# Patient Record
Sex: FEMALE | Race: OTHER | HISPANIC OR LATINO | URBAN - METROPOLITAN AREA
[De-identification: names, ages, dates, MRNs, and addresses within clinical notes are randomized per-mention and may not be internally consistent; named-entity substitution may affect disease eponyms.]

---

## 2024-09-22 PROBLEM — D50.9 ANEMIA, IRON DEFICIENCY: Status: ACTIVE | Noted: 2024-09-22

## 2024-09-23 ENCOUNTER — OUTPATIENT (OUTPATIENT)
Dept: OUTPATIENT SERVICES | Facility: HOSPITAL | Age: 39
LOS: 1 days | End: 2024-09-23
Payer: MEDICAID

## 2024-09-23 DIAGNOSIS — O26.899 OTHER SPECIFIED PREGNANCY RELATED CONDITIONS, UNSPECIFIED TRIMESTER: ICD-10-CM

## 2024-09-23 PROCEDURE — G0463: CPT

## 2024-09-23 PROCEDURE — 99212 OFFICE O/P EST SF 10 MIN: CPT

## 2024-09-27 ENCOUNTER — OUTPATIENT (OUTPATIENT)
Dept: INPATIENT UNIT | Facility: HOSPITAL | Age: 39
LOS: 1 days | Discharge: HOME | End: 2024-09-27
Payer: MEDICAID

## 2024-09-27 VITALS
RESPIRATION RATE: 15 BRPM | TEMPERATURE: 99 F | DIASTOLIC BLOOD PRESSURE: 67 MMHG | SYSTOLIC BLOOD PRESSURE: 125 MMHG | HEART RATE: 71 BPM

## 2024-09-27 VITALS — OXYGEN SATURATION: 98 % | HEART RATE: 68 BPM

## 2024-09-27 DIAGNOSIS — O26.899 OTHER SPECIFIED PREGNANCY RELATED CONDITIONS, UNSPECIFIED TRIMESTER: ICD-10-CM

## 2024-09-27 DIAGNOSIS — Z98.890 OTHER SPECIFIED POSTPROCEDURAL STATES: Chronic | ICD-10-CM

## 2024-09-27 LAB
ALBUMIN SERPL ELPH-MCNC: 3.3 G/DL — SIGNIFICANT CHANGE UP (ref 3.3–5)
ALP SERPL-CCNC: 99 U/L — SIGNIFICANT CHANGE UP (ref 40–120)
ALT FLD-CCNC: 9 U/L — SIGNIFICANT CHANGE UP (ref 4–33)
ANION GAP SERPL CALC-SCNC: 12 MMOL/L — SIGNIFICANT CHANGE UP (ref 7–14)
APPEARANCE UR: ABNORMAL
AST SERPL-CCNC: 11 U/L — SIGNIFICANT CHANGE UP (ref 4–32)
BACTERIA # UR AUTO: ABNORMAL /HPF
BASOPHILS # BLD AUTO: 0.03 K/UL — SIGNIFICANT CHANGE UP (ref 0–0.2)
BASOPHILS NFR BLD AUTO: 0.3 % — SIGNIFICANT CHANGE UP (ref 0–2)
BILIRUB SERPL-MCNC: <0.2 MG/DL — SIGNIFICANT CHANGE UP (ref 0.2–1.2)
BILIRUB UR-MCNC: NEGATIVE — SIGNIFICANT CHANGE UP
BUN SERPL-MCNC: 5 MG/DL — LOW (ref 7–23)
CALCIUM SERPL-MCNC: 8.7 MG/DL — SIGNIFICANT CHANGE UP (ref 8.4–10.5)
CAST: 1 /LPF — SIGNIFICANT CHANGE UP (ref 0–4)
CHLORIDE SERPL-SCNC: 106 MMOL/L — SIGNIFICANT CHANGE UP (ref 98–107)
CO2 SERPL-SCNC: 20 MMOL/L — LOW (ref 22–31)
COLOR SPEC: YELLOW — SIGNIFICANT CHANGE UP
CREAT ?TM UR-MCNC: 44 MG/DL — SIGNIFICANT CHANGE UP
CREAT SERPL-MCNC: 0.47 MG/DL — LOW (ref 0.5–1.3)
DIFF PNL FLD: NEGATIVE — SIGNIFICANT CHANGE UP
EGFR: 125 ML/MIN/1.73M2 — SIGNIFICANT CHANGE UP
EOSINOPHIL # BLD AUTO: 0.06 K/UL — SIGNIFICANT CHANGE UP (ref 0–0.5)
EOSINOPHIL NFR BLD AUTO: 0.6 % — SIGNIFICANT CHANGE UP (ref 0–6)
GLUCOSE SERPL-MCNC: 76 MG/DL — SIGNIFICANT CHANGE UP (ref 70–99)
GLUCOSE UR QL: NEGATIVE MG/DL — SIGNIFICANT CHANGE UP
HCT VFR BLD CALC: 31.2 % — LOW (ref 34.5–45)
HGB BLD-MCNC: 10.1 G/DL — LOW (ref 11.5–15.5)
IANC: 6.58 K/UL — SIGNIFICANT CHANGE UP (ref 1.8–7.4)
IMM GRANULOCYTES NFR BLD AUTO: 0.5 % — SIGNIFICANT CHANGE UP (ref 0–0.9)
KETONES UR-MCNC: NEGATIVE MG/DL — SIGNIFICANT CHANGE UP
LDH SERPL L TO P-CCNC: 171 U/L — SIGNIFICANT CHANGE UP (ref 135–225)
LEUKOCYTE ESTERASE UR-ACNC: ABNORMAL
LYMPHOCYTES # BLD AUTO: 2.04 K/UL — SIGNIFICANT CHANGE UP (ref 1–3.3)
LYMPHOCYTES # BLD AUTO: 21.8 % — SIGNIFICANT CHANGE UP (ref 13–44)
MCHC RBC-ENTMCNC: 30.3 PG — SIGNIFICANT CHANGE UP (ref 27–34)
MCHC RBC-ENTMCNC: 32.4 GM/DL — SIGNIFICANT CHANGE UP (ref 32–36)
MCV RBC AUTO: 93.7 FL — SIGNIFICANT CHANGE UP (ref 80–100)
MONOCYTES # BLD AUTO: 0.59 K/UL — SIGNIFICANT CHANGE UP (ref 0–0.9)
MONOCYTES NFR BLD AUTO: 6.3 % — SIGNIFICANT CHANGE UP (ref 2–14)
NEUTROPHILS # BLD AUTO: 6.58 K/UL — SIGNIFICANT CHANGE UP (ref 1.8–7.4)
NEUTROPHILS NFR BLD AUTO: 70.5 % — SIGNIFICANT CHANGE UP (ref 43–77)
NITRITE UR-MCNC: NEGATIVE — SIGNIFICANT CHANGE UP
NRBC # BLD: 0 /100 WBCS — SIGNIFICANT CHANGE UP (ref 0–0)
NRBC # FLD: 0 K/UL — SIGNIFICANT CHANGE UP (ref 0–0)
PH UR: 6.5 — SIGNIFICANT CHANGE UP (ref 5–8)
PLATELET # BLD AUTO: 268 K/UL — SIGNIFICANT CHANGE UP (ref 150–400)
POTASSIUM SERPL-MCNC: 4 MMOL/L — SIGNIFICANT CHANGE UP (ref 3.5–5.3)
POTASSIUM SERPL-SCNC: 4 MMOL/L — SIGNIFICANT CHANGE UP (ref 3.5–5.3)
PROT ?TM UR-MCNC: 8 MG/DL — SIGNIFICANT CHANGE UP
PROT SERPL-MCNC: 6.6 G/DL — SIGNIFICANT CHANGE UP (ref 6–8.3)
PROT UR-MCNC: NEGATIVE MG/DL — SIGNIFICANT CHANGE UP
PROT/CREAT UR-RTO: 0.2 RATIO — SIGNIFICANT CHANGE UP (ref 0–0.2)
RBC # BLD: 3.33 M/UL — LOW (ref 3.8–5.2)
RBC # FLD: 12.9 % — SIGNIFICANT CHANGE UP (ref 10.3–14.5)
RBC CASTS # UR COMP ASSIST: 0 /HPF — SIGNIFICANT CHANGE UP (ref 0–4)
REVIEW: SIGNIFICANT CHANGE UP
SODIUM SERPL-SCNC: 138 MMOL/L — SIGNIFICANT CHANGE UP (ref 135–145)
SP GR SPEC: 1.01 — SIGNIFICANT CHANGE UP (ref 1–1.03)
SQUAMOUS # UR AUTO: 12 /HPF — HIGH (ref 0–5)
URATE SERPL-MCNC: 4.1 MG/DL — SIGNIFICANT CHANGE UP (ref 2.5–7)
UROBILINOGEN FLD QL: 0.2 MG/DL — SIGNIFICANT CHANGE UP (ref 0.2–1)
WBC # BLD: 9.35 K/UL — SIGNIFICANT CHANGE UP (ref 3.8–10.5)
WBC # FLD AUTO: 9.35 K/UL — SIGNIFICANT CHANGE UP (ref 3.8–10.5)
WBC UR QL: 35 /HPF — HIGH (ref 0–5)

## 2024-09-27 PROCEDURE — 99222 1ST HOSP IP/OBS MODERATE 55: CPT

## 2024-09-27 RX ORDER — DIPHENHYDRAMINE HCL 12.5MG/5ML
25 LIQUID (ML) ORAL ONCE
Refills: 0 | Status: COMPLETED | OUTPATIENT
Start: 2024-09-27 | End: 2024-09-27

## 2024-09-27 RX ORDER — SODIUM CHLORIDE IRRIG SOLUTION 0.9 %
500 SOLUTION, IRRIGATION IRRIGATION ONCE
Refills: 0 | Status: COMPLETED | OUTPATIENT
Start: 2024-09-27 | End: 2024-09-27

## 2024-09-27 RX ORDER — METOCLOPRAMIDE HCL 5 MG
10 TABLET ORAL ONCE
Refills: 0 | Status: COMPLETED | OUTPATIENT
Start: 2024-09-27 | End: 2024-09-27

## 2024-09-27 RX ADMIN — Medication 1000 MILLILITER(S): at 20:10

## 2024-09-27 RX ADMIN — Medication 10 MILLIGRAM(S): at 19:11

## 2024-09-27 RX ADMIN — Medication 25 MILLIGRAM(S): at 19:13

## 2024-09-27 NOTE — OB RN TRIAGE NOTE - TELEPHONIC ID NUMBER OF THE INTERPRETER
Pharmacy Medication History Note      List of current medications patient is taking is complete. Source of information: Patient    Changes made to medication list:  Medications removed (include reason, ex. therapy complete or physician discontinued):  Estradiol-progesterone- not taking per patient    Medications added/doses adjusted:  [place] Thyroid- patient is taking at home  Hydrocortisone- patient is taking at home    Other notes (ex. Recent course of antibiotics, Coumadin dosing):  Denies use of other OTC or herbal medications.       Allergies reviewed      Electronically signed by Damaris Jay on 9/10/2019 at 11:33 AM [id number]

## 2024-09-27 NOTE — OB PROVIDER TRIAGE NOTE - NSHPPHYSICALEXAM_GEN_ALL_CORE
Assessment reveals VSS, abdomen soft, NT, gravid.   Cat 1 FHT x 2, ctx 2-4 on toco, patient unaware.  SSE- cervix appears closed. Pea sized ulcerated lesion noted on left labia. Reports she tried to shave and thinks she cut herself. Reports it was painful. Denies H/O HSV. Viral culture sent.   CL 4.9 no funneling or dynamic changes  VE 1/0/-3    HELLP labs  Benadryl 25mg IVP/Reglan 10 MG IVP Assessment reveals VSS, abdomen soft, NT, gravid.   Cat 1 FHT x 2, ctx 2-4 on toco, patient unaware.  SSE- cervix appears closed. Pea sized ulcerated lesion noted on left labia. Reports she tried to shave and thinks she cut herself. Reports it was painful. Denies H/O HSV. Viral culture sent.   CL 4.9 no funneling or dynamic changes  VE 1/0/-3    HELLP labs  Benadryl 25mg IVP/Reglan 10 MG IVP    2000: HELLP labs WNL  PCR 0.2  D/W Dr. Rios MFM fellow.     For LR 500cc bolus Assessment reveals VSS, abdomen soft, NT, gravid.   Cat 1 FHT x 2, ctx 2-4 on toco, patient unaware.  SSE- cervix appears closed. Pea sized ulcerated lesion noted on left labia. Reports she tried to shave and thinks she cut herself. Reports it was painful. Denies H/O HSV. Viral culture sent.   CL 4.9 no funneling or dynamic changes  VE 1/0/-3    HELLP labs  Benadryl 25mg IVP/Reglan 10 MG IVP    2000: HELLP labs WNL  Reports complete relief of PERALTA  PCR 0.2  D/W Dr. Rios M fellow.     For LR 500cc bolus and re-evaluate in 2 hours    2140: Cat 1 FHT x 2, ctx 2-4 on toco, patient unaware.   VE 1/0/-3 (exam unchanged)     [id number] used  Plan of care d/w patient at length. Recommended to transfer here for pregnancy. Reports she likes her MD at [facility] and wants to stay there. Explained that if she needed to be delivered , she would be sent here for the NICU, patient reports she understands. Told to return here with any signs of labor.     Plan D/W Dr. Carranza, due to frequent contractions, for continued monitoring and to be re-evaluated by Dr. Lara. Assessment reveals VSS, abdomen soft, NT, gravid.   Cat 1 FHT x 2, ctx 2-4 on toco, patient unaware.  SSE- cervix appears closed. Pea sized ulcerated lesion noted on left labia. Reports she tried to shave and thinks she cut herself. Reports it was painful. Denies H/O HSV. Viral culture sent.   CL 4.9 no funneling or dynamic changes  VE 1/0/-3    HELLP labs  Benadryl 25mg IVP/Reglan 10 MG IVP    2000: HELLP labs WNL  Reports complete relief of PERALTA  PCR 0.2  D/W Dr. Rios M fellow.     For LR 500cc bolus and re-evaluate in 2 hours    0: Cat 1 FHT x 2, ctx 2-4 on toco, patient unaware.   VE 1/0/-3 (exam unchanged)     [id number] used  Plan of care d/w patient at length. Recommended to transfer here for pregnancy. Reports she likes her MD at [facility] and wants to stay there. Explained that if she needed to be delivered , she would be sent here for the NICU, patient reports she understands. Told to return here with any signs of labor.     Plan D/W Dr. Carranza, due to frequent contractions, for continued monitoring and to be re-evaluated by Dr. Lara.    : Patient re-evaluated by Dr. Lara, exam unchanged. Assessment reveals VSS, abdomen soft, NT, gravid.   Cat 1 FHT x 2, ctx 2-4 on toco, patient unaware.  SSE- cervix appears closed. Pea sized ulcerated lesion noted on left labia. Reports she tried to shave and thinks she cut herself. Reports it was painful. Denies H/O HSV. Viral culture sent.   CL 4.9 no funneling or dynamic changes  VE 1/0/-3    HELLP labs  Benadryl 25mg IVP/Reglan 10 MG IVP    2000: HELLP labs WNL  Reports complete relief of PERALTA  PCR 0.2  D/W Dr. Rios [facility] fellow.     For LR 500cc bolus and re-evaluate in 2 hours    0: Cat 1 FHT x 2, ctx 2-4 on toco, patient unaware.   VE 1/0/-3 (exam unchanged)     [id number] used  Plan of care d/w patient at length. Recommended to transfer here for pregnancy. Reports she likes her MD at [facility] and wants to stay there. Explained that if she needed to be delivered , she would be sent here for the NICU, patient reports she understands. Told to return here with any signs of labor.     Plan D/W Dr. Carranza, due to frequent contractions, for continued monitoring and to be re-evaluated by Dr. Lara.    : Patient re-evaluated by Dr. Lara, exam unchanged.  Plan discussed between Dr. Lara and Dr. [facility]

## 2024-09-27 NOTE — OB RN TRIAGE NOTE - CHIEF COMPLAINT QUOTE
sent from [facility] for r/o PEC, headache x4 days no relief with tylenol.

## 2024-09-27 NOTE — OB PROVIDER TRIAGE NOTE - NS_OBGYNHISTORY_OBGYN_ALL_OB_FT
FT no complications    FT no complications    AP course complicated by:  Fibroids  Di-Di TIUP  IUGR fetus B  CHTN    ATU us today:   BPP 8/8 x 2, Fetus A 1090gm, 36th%- dopplers WNL, vtx  Fetus B 816gm 1st%- S/D ration dopplers elevated, vtx  CL > 4cm

## 2024-09-27 NOTE — OB PROVIDER TRIAGE NOTE - NS_SPECEXAM_OBGYN_ALL_OB
Multiple attempts made to reach the pt and messages left with no returned phone call. Past TCM timeframe, closing encounter. Yes

## 2024-09-27 NOTE — OB PROVIDER TRIAGE NOTE - CURRENT PREGNANCY COMPLICATIONS, OB PROFILE
Multiple Gestation/Gestational Age less than 36 Weeks/Hypertensive Disorder/Intrauterine Growth Restriction/Maternal Unknown GBS

## 2024-09-27 NOTE — OB PROVIDER TRIAGE NOTE - NSDOBORLOSS1_OBGYN_ALL_OB_DT
From: Miguel A Finley  To: Chad Beth  Sent: 5/30/2024 1:45 PM CDT  Subject: Test Results    Dr. Beth,  I had the Basic Metabolic Panel redone on 5/29/24. Compared to the results of the 4/30/24 test, the values are now all within the normal range. The GFR is the highest it has been in a long time. You mentioned in my last After Visit Summary notes that after the above tests were done you would place an order in the system for a CBC, BMP and Hepatic Function Panel before my visit in 6 months with Dr. Barragan.  Thank you again for all the years of health care you have given me, especially in the last few years. Enjoy your [facility].  Nagi Finley   05-Feb-2012

## 2024-09-27 NOTE — OB PROVIDER TRIAGE NOTE - SECONDARY DIAGNOSIS.
Dichorionic diamniotic twin gestation False labor before 37 completed weeks of gestation, second trimester

## 2024-09-27 NOTE — OB PROVIDER TRIAGE NOTE - HISTORY OF PRESENT ILLNESS
History and Physical performed using  [id number]  37yo  @ 27.4 with naturally conceived Di-Di TIUP sent for evaluation for R/O PEC. Patent gets care with Dr. Chin of [facility]. Was seen today in ATU and c/o HA x 4 days unrelieved by Tylenol. Reports HA waxes and wains and is worse at night.  Denies N/V, visual disturbances, epigastric pain. Denies LOF, VB and reports GFM. Normal BP's this pregnancy.  Patient back and fourth to [facility] this pregnancy Was diagnosed there in July with CHTN and started in labetalol 100mg BID. Last dose 9am.  Today was her first ATU sonogram in [place] since nuchal. Had US in [facility] and found to have IUGR of Baby B. Reports she will not be returning to [facility] anymore this pregnancy.  AP course complicated by IUGR of fetus B/CHTN    Reports recent H/O Anxiety and feeling of SOB. Reports shes unable to sleep due to the anxiety for past few days. Has therapy apt next week. Has never had these symptoms before and never sought therapy. Reports feeling safe at home and denies suicidal or self harm ideation.   Right Knee Sx  CHTN  Fibroids

## 2024-09-27 NOTE — OB PROVIDER TRIAGE NOTE - ADDITIONAL INSTRUCTIONS
Follow up Monday for ultrasound as scheduled  [facility] clinic [phone] (to transfer care)   Return here for signs of labor such as leaking of fluid, vaginal bleeding, abdominal pain or for decreased fetal movement.

## 2024-09-27 NOTE — OB PROVIDER TRIAGE NOTE - NSOBPROVIDERNOTE_OBGYN_ALL_OB_FT
Plan D/W Dr. Lara, patient dc'd home.  Follow up Monday for ultrasound as scheduled  [facility] clinic [phone] (to transfer care)   Return here for signs of labor such as leaking of fluid, vaginal bleeding, abdominal pain or for decreased fetal movement.

## 2024-09-27 NOTE — OB PROVIDER TRIAGE NOTE - NS_FHRDECEL_OBGYN_ALL_OB
[FreeTextEntry1] : I have reviewed the pertinent imaging, blood work and pathology.  No Decelerations

## 2024-09-28 PROBLEM — Z78.9 OTHER SPECIFIED HEALTH STATUS: Chronic | Status: INACTIVE | Noted: 2019-09-21 | Resolved: 2024-09-27

## 2024-09-28 LAB
HSV+VZV DNA SPEC QL NAA+PROBE: ABNORMAL
SPECIMEN SOURCE: SIGNIFICANT CHANGE UP

## 2024-09-30 DIAGNOSIS — O99.342 OTHER MENTAL DISORDERS COMPLICATING PREGNANCY, SECOND TRIMESTER: ICD-10-CM

## 2024-09-30 DIAGNOSIS — O99.891 OTHER SPECIFIED DISEASES AND CONDITIONS COMPLICATING PREGNANCY: ICD-10-CM

## 2024-09-30 DIAGNOSIS — O10.912 UNSPECIFIED PRE-EXISTING HYPERTENSION COMPLICATING PREGNANCY, SECOND TRIMESTER: ICD-10-CM

## 2024-09-30 DIAGNOSIS — O30.042 TWIN PREGNANCY, DICHORIONIC/DIAMNIOTIC, SECOND TRIMESTER: ICD-10-CM

## 2024-09-30 DIAGNOSIS — Z3A.27 27 WEEKS GESTATION OF PREGNANCY: ICD-10-CM

## 2024-09-30 DIAGNOSIS — O09.522 SUPERVISION OF ELDERLY MULTIGRAVIDA, SECOND TRIMESTER: ICD-10-CM

## 2024-09-30 DIAGNOSIS — O47.02 FALSE LABOR BEFORE 37 COMPLETED WEEKS OF GESTATION, SECOND TRIMESTER: ICD-10-CM

## 2024-09-30 DIAGNOSIS — O36.5922 MATERNAL CARE FOR OTHER KNOWN OR SUSPECTED POOR FETAL GROWTH, SECOND TRIMESTER, FETUS 2: ICD-10-CM

## 2024-09-30 DIAGNOSIS — D21.9 BENIGN NEOPLASM OF CONNECTIVE AND OTHER SOFT TISSUE, UNSPECIFIED: ICD-10-CM

## 2024-09-30 PROBLEM — I10 ESSENTIAL (PRIMARY) HYPERTENSION: Chronic | Status: ACTIVE | Noted: 2024-09-27

## 2024-09-30 RX ORDER — VALACYCLOVIR 1000 MG/1
1 TABLET ORAL
Qty: 14 | Refills: 0
Start: 2024-09-30 | End: 2024-10-06

## 2024-09-30 NOTE — CHART NOTE - NSCHARTNOTEFT_GEN_A_CORE
R3 OB Chart Note   ID [id number]    Called pt to discuss HSV-2 positive vulvar lesion from L&D triage visit 9/27. Encouraged pt to discuss dx with sexual partners. Encouraged pt to notify her OB at [place] and that prophylactic Valtrex would be indicated at 36w for suppression near timing of delivery. 1g Valtrex BID x7d sent to pt's preferred pharmacy. NKDA confirmed. Questions answered.    VTimmel PGY3
Received telephone report regarding this patient with history obtained by triage provider:    Patient is a 37yo  at 27w4d with di-di TIUP, history of cHTN on labetalol 100mg BID, sent from ATU for severe growth restriction of baby B (EFW 816g, 1%ile, AC 4%ile) with elevated UAD and complaints of persistent headache. Patient reports being diagnosed with cHTN early in pregnancy and was started on labetaloll 100mg which she takes and was also diagnosed with IUGR in [facility] in July. Headache has lasted for 4 days and patient believes it is possibly due to lack of sleep due to anxiety. Plans to see therapist. No other acute symptoms elicited.      On monitor reactive NST for both fetuses, annamarie q3min on toco (not felt by patient). Triage exam by ACP was 1/0/-3. CL 4cm on TVUS. Preeclamptic labs all wnl. Upcr 0.2. BP also within normal range.   Per triage provider, after dose of reglan and benadryl patient reported that headache had completely resolved.     Recommendations:  - IV fluid hydration and re-examination 2 hrs after last exam due to frequent contractions.  - If exam is unchanged, patient remains asymptomatic, NST reactive with no decels, and BPs normotensive, can discharge with precautions.  - Script for reglan/benadryl if tylenol not helping at home.  - Follow up for twice weekly BPP/Dopplers at [address] [place] [address] clinic if patient amenable to transferring care   - f/u viral culture of ulcerative lesion of labia     Discussed with CONCEPCIÓN Saleh Attending  CONCEPCIÓN Cooley Fellow
HSV 2 detected PCR  d/w Dr Burrell pt has appt with PCAP on 9/30 will notify Margaret Hegarty NP

## 2024-10-07 DIAGNOSIS — O26.892 OTHER SPECIFIED PREGNANCY RELATED CONDITIONS, SECOND TRIMESTER: ICD-10-CM

## 2024-10-07 DIAGNOSIS — O10.912 UNSPECIFIED PRE-EXISTING HYPERTENSION COMPLICATING PREGNANCY, SECOND TRIMESTER: ICD-10-CM

## 2024-10-07 DIAGNOSIS — Z3A.27 27 WEEKS GESTATION OF PREGNANCY: ICD-10-CM

## 2024-10-07 DIAGNOSIS — O30.042 TWIN PREGNANCY, DICHORIONIC/DIAMNIOTIC, SECOND TRIMESTER: ICD-10-CM

## 2024-10-07 DIAGNOSIS — O09.522 SUPERVISION OF ELDERLY MULTIGRAVIDA, SECOND TRIMESTER: ICD-10-CM

## 2024-10-07 DIAGNOSIS — R06.02 SHORTNESS OF BREATH: ICD-10-CM

## 2024-10-07 DIAGNOSIS — D64.9 ANEMIA, UNSPECIFIED: ICD-10-CM

## 2024-10-07 DIAGNOSIS — O99.342 OTHER MENTAL DISORDERS COMPLICATING PREGNANCY, SECOND TRIMESTER: ICD-10-CM

## 2024-10-07 DIAGNOSIS — O99.810 ABNORMAL GLUCOSE COMPLICATING PREGNANCY: ICD-10-CM

## 2024-10-07 DIAGNOSIS — O99.012 ANEMIA COMPLICATING PREGNANCY, SECOND TRIMESTER: ICD-10-CM

## 2024-10-09 ENCOUNTER — NON-APPOINTMENT (OUTPATIENT)
Age: 39
End: 2024-10-09

## 2024-10-11 ENCOUNTER — APPOINTMENT (OUTPATIENT)
Dept: ANTEPARTUM | Facility: CLINIC | Age: 39
End: 2024-10-11
Payer: MEDICAID

## 2024-10-11 ENCOUNTER — NON-APPOINTMENT (OUTPATIENT)
Age: 39
End: 2024-10-11

## 2024-10-11 ENCOUNTER — ASOB RESULT (OUTPATIENT)
Age: 39
End: 2024-10-11

## 2024-10-11 PROCEDURE — 76820 UMBILICAL ARTERY ECHO: CPT | Mod: 59

## 2024-10-11 PROCEDURE — 76819 FETAL BIOPHYS PROFIL W/O NST: CPT

## 2024-10-11 PROCEDURE — 76816 OB US FOLLOW-UP PER FETUS: CPT | Mod: 59

## 2024-10-14 ENCOUNTER — APPOINTMENT (OUTPATIENT)
Dept: MATERNAL FETAL MEDICINE | Facility: HOSPITAL | Age: 39
End: 2024-10-14

## 2024-10-14 ENCOUNTER — NON-APPOINTMENT (OUTPATIENT)
Age: 39
End: 2024-10-14

## 2024-10-14 ENCOUNTER — APPOINTMENT (OUTPATIENT)
Dept: ANTEPARTUM | Facility: CLINIC | Age: 39
End: 2024-10-14

## 2024-10-14 ENCOUNTER — RESULT REVIEW (OUTPATIENT)
Age: 39
End: 2024-10-14

## 2024-10-14 ENCOUNTER — OUTPATIENT (OUTPATIENT)
Dept: OUTPATIENT SERVICES | Facility: HOSPITAL | Age: 39
LOS: 1 days | End: 2024-10-14

## 2024-10-14 ENCOUNTER — INPATIENT (INPATIENT)
Facility: HOSPITAL | Age: 39
LOS: 1 days | Discharge: HOME | End: 2024-10-16
Attending: SPECIALIST | Admitting: SPECIALIST
Payer: MEDICAID

## 2024-10-14 ENCOUNTER — MED ADMIN CHARGE (OUTPATIENT)
Age: 39
End: 2024-10-14

## 2024-10-14 ENCOUNTER — ASOB RESULT (OUTPATIENT)
Age: 39
End: 2024-10-14

## 2024-10-14 ENCOUNTER — APPOINTMENT (OUTPATIENT)
Dept: ANTEPARTUM | Facility: HOSPITAL | Age: 39
End: 2024-10-14
Payer: MEDICAID

## 2024-10-14 ENCOUNTER — APPOINTMENT (OUTPATIENT)
Dept: MATERNAL FETAL MEDICINE | Facility: HOSPITAL | Age: 39
End: 2024-10-14
Payer: MEDICAID

## 2024-10-14 ENCOUNTER — APPOINTMENT (OUTPATIENT)
Dept: OBGYN | Facility: CLINIC | Age: 39
End: 2024-10-14

## 2024-10-14 VITALS
WEIGHT: 244.05 LBS | RESPIRATION RATE: 16 BRPM | TEMPERATURE: 98 F | HEART RATE: 72 BPM | HEIGHT: 67 IN | DIASTOLIC BLOOD PRESSURE: 62 MMHG | SYSTOLIC BLOOD PRESSURE: 116 MMHG

## 2024-10-14 VITALS
BODY MASS INDEX: 38.45 KG/M2 | DIASTOLIC BLOOD PRESSURE: 90 MMHG | HEIGHT: 67 IN | SYSTOLIC BLOOD PRESSURE: 113 MMHG | HEART RATE: 73 BPM | WEIGHT: 245 LBS | TEMPERATURE: 97.9 F

## 2024-10-14 DIAGNOSIS — Z78.9 OTHER SPECIFIED HEALTH STATUS: ICD-10-CM

## 2024-10-14 DIAGNOSIS — O26.899 OTHER SPECIFIED PREGNANCY RELATED CONDITIONS, UNSPECIFIED TRIMESTER: ICD-10-CM

## 2024-10-14 DIAGNOSIS — Z98.890 OTHER SPECIFIED POSTPROCEDURAL STATES: Chronic | ICD-10-CM

## 2024-10-14 DIAGNOSIS — D50.9 IRON DEFICIENCY ANEMIA, UNSPECIFIED: ICD-10-CM

## 2024-10-14 DIAGNOSIS — O09.899 SUPERVISION OF OTHER HIGH RISK PREGNANCIES, UNSPECIFIED TRIMESTER: ICD-10-CM

## 2024-10-14 DIAGNOSIS — Z23 ENCOUNTER FOR IMMUNIZATION: ICD-10-CM

## 2024-10-14 DIAGNOSIS — O16.9 UNSPECIFIED MATERNAL HYPERTENSION, UNSPECIFIED TRIMESTER: ICD-10-CM

## 2024-10-14 DIAGNOSIS — Z67.91 OTHER SPECIFIED PREGNANCY RELATED CONDITIONS, UNSPECIFIED TRIMESTER: ICD-10-CM

## 2024-10-14 PROBLEM — O36.5990 FETAL GROWTH RESTRICTION ANTEPARTUM: Status: ACTIVE | Noted: 2024-10-14

## 2024-10-14 PROBLEM — O09.93 SUPERVISION OF HIGH RISK PREGNANCY IN THIRD TRIMESTER: Status: ACTIVE | Noted: 2024-10-14

## 2024-10-14 PROBLEM — O30.049 TWIN DICHORIONIC DIAMNIOTIC PLACENTA: Status: ACTIVE | Noted: 2024-10-14

## 2024-10-14 LAB
24R-OH-CALCIDIOL SERPL-MCNC: 13.2 NG/ML — LOW (ref 30–80)
ALBUMIN SERPL ELPH-MCNC: 3.6 G/DL — SIGNIFICANT CHANGE UP (ref 3.3–5)
ALP SERPL-CCNC: 119 U/L — SIGNIFICANT CHANGE UP (ref 40–120)
ALT FLD-CCNC: 7 U/L — SIGNIFICANT CHANGE UP (ref 4–33)
ANION GAP SERPL CALC-SCNC: 12 MMOL/L — SIGNIFICANT CHANGE UP (ref 7–14)
APPEARANCE UR: ABNORMAL
AST SERPL-CCNC: 11 U/L — SIGNIFICANT CHANGE UP (ref 4–32)
BACTERIA # UR AUTO: ABNORMAL /HPF
BASOPHILS # BLD AUTO: 0.02 K/UL — SIGNIFICANT CHANGE UP (ref 0–0.2)
BASOPHILS NFR BLD AUTO: 0.2 % — SIGNIFICANT CHANGE UP (ref 0–2)
BILIRUB SERPL-MCNC: <0.2 MG/DL — SIGNIFICANT CHANGE UP (ref 0.2–1.2)
BILIRUB UR-MCNC: NEGATIVE — SIGNIFICANT CHANGE UP
BLD GP AB SCN SERPL QL: NEGATIVE — SIGNIFICANT CHANGE UP
BUN SERPL-MCNC: 7 MG/DL — SIGNIFICANT CHANGE UP (ref 7–23)
CALCIUM SERPL-MCNC: 8.9 MG/DL — SIGNIFICANT CHANGE UP (ref 8.4–10.5)
CAST: 13 /LPF — HIGH (ref 0–4)
CHLORIDE SERPL-SCNC: 102 MMOL/L — SIGNIFICANT CHANGE UP (ref 98–107)
CO2 SERPL-SCNC: 21 MMOL/L — LOW (ref 22–31)
COLOR SPEC: YELLOW — SIGNIFICANT CHANGE UP
CREAT ?TM UR-MCNC: 178 MG/DL — SIGNIFICANT CHANGE UP
CREAT SERPL-MCNC: 0.51 MG/DL — SIGNIFICANT CHANGE UP (ref 0.5–1.3)
DIFF PNL FLD: ABNORMAL
EGFR: 122 ML/MIN/1.73M2 — SIGNIFICANT CHANGE UP
EOSINOPHIL # BLD AUTO: 0.05 K/UL — SIGNIFICANT CHANGE UP (ref 0–0.5)
EOSINOPHIL NFR BLD AUTO: 0.6 % — SIGNIFICANT CHANGE UP (ref 0–6)
GESTATIONAL GTT PNL UR+SERPL: 75 MG/DL — SIGNIFICANT CHANGE UP (ref 70–94)
GLUCOSE 1H P CHAL SERPL-MCNC: 122 MG/DL — SIGNIFICANT CHANGE UP (ref 70–179)
GLUCOSE SERPL-MCNC: 97 MG/DL — SIGNIFICANT CHANGE UP (ref 70–99)
GLUCOSE UR QL: NEGATIVE MG/DL — SIGNIFICANT CHANGE UP
GTT GEST 2H PNL UR+SERPL: 106 MG/DL — SIGNIFICANT CHANGE UP (ref 70–154)
GTT GEST 3H PNL SERPL: 64 MG/DL — LOW (ref 70–139)
HCT VFR BLD CALC: 32.9 % — LOW (ref 34.5–45)
HGB BLD-MCNC: 10.8 G/DL — LOW (ref 11.5–15.5)
IANC: 6.55 K/UL — SIGNIFICANT CHANGE UP (ref 1.8–7.4)
IMM GRANULOCYTES NFR BLD AUTO: 0.5 % — SIGNIFICANT CHANGE UP (ref 0–0.9)
KETONES UR-MCNC: NEGATIVE MG/DL — SIGNIFICANT CHANGE UP
LDH SERPL L TO P-CCNC: 167 U/L — SIGNIFICANT CHANGE UP (ref 135–225)
LEUKOCYTE ESTERASE UR-ACNC: ABNORMAL
LYMPHOCYTES # BLD AUTO: 1.76 K/UL — SIGNIFICANT CHANGE UP (ref 1–3.3)
LYMPHOCYTES # BLD AUTO: 19.9 % — SIGNIFICANT CHANGE UP (ref 13–44)
MCHC RBC-ENTMCNC: 30.6 PG — SIGNIFICANT CHANGE UP (ref 27–34)
MCHC RBC-ENTMCNC: 32.8 GM/DL — SIGNIFICANT CHANGE UP (ref 32–36)
MCV RBC AUTO: 93.2 FL — SIGNIFICANT CHANGE UP (ref 80–100)
MONOCYTES # BLD AUTO: 0.43 K/UL — SIGNIFICANT CHANGE UP (ref 0–0.9)
MONOCYTES NFR BLD AUTO: 4.9 % — SIGNIFICANT CHANGE UP (ref 2–14)
NEUTROPHILS # BLD AUTO: 6.55 K/UL — SIGNIFICANT CHANGE UP (ref 1.8–7.4)
NEUTROPHILS NFR BLD AUTO: 73.9 % — SIGNIFICANT CHANGE UP (ref 43–77)
NITRITE UR-MCNC: NEGATIVE — SIGNIFICANT CHANGE UP
NRBC # BLD: 0 /100 WBCS — SIGNIFICANT CHANGE UP (ref 0–0)
NRBC # FLD: 0 K/UL — SIGNIFICANT CHANGE UP (ref 0–0)
PH UR: 6 — SIGNIFICANT CHANGE UP (ref 5–8)
PLATELET # BLD AUTO: 265 K/UL — SIGNIFICANT CHANGE UP (ref 150–400)
POTASSIUM SERPL-MCNC: 3.8 MMOL/L — SIGNIFICANT CHANGE UP (ref 3.5–5.3)
POTASSIUM SERPL-SCNC: 3.8 MMOL/L — SIGNIFICANT CHANGE UP (ref 3.5–5.3)
PROT ?TM UR-MCNC: 99 MG/DL — SIGNIFICANT CHANGE UP
PROT SERPL-MCNC: 6.8 G/DL — SIGNIFICANT CHANGE UP (ref 6–8.3)
PROT UR-MCNC: 30 MG/DL
PROT/CREAT UR-RTO: 0.6 RATIO — HIGH (ref 0–0.2)
RBC # BLD: 3.53 M/UL — LOW (ref 3.8–5.2)
RBC # FLD: 13.2 % — SIGNIFICANT CHANGE UP (ref 10.3–14.5)
RBC CASTS # UR COMP ASSIST: 2 /HPF — SIGNIFICANT CHANGE UP (ref 0–4)
REVIEW: SIGNIFICANT CHANGE UP
RH IG SCN BLD-IMP: NEGATIVE — SIGNIFICANT CHANGE UP
SODIUM SERPL-SCNC: 135 MMOL/L — SIGNIFICANT CHANGE UP (ref 135–145)
SP GR SPEC: 1.02 — SIGNIFICANT CHANGE UP (ref 1–1.03)
SQUAMOUS # UR AUTO: 111 /HPF — HIGH (ref 0–5)
URATE SERPL-MCNC: 4 MG/DL — SIGNIFICANT CHANGE UP (ref 2.5–7)
URATE SERPL-MCNC: 4 MG/DL — SIGNIFICANT CHANGE UP (ref 2.5–7)
UROBILINOGEN FLD QL: 1 MG/DL — SIGNIFICANT CHANGE UP (ref 0.2–1)
WBC # BLD: 8.85 K/UL — SIGNIFICANT CHANGE UP (ref 3.8–10.5)
WBC # FLD AUTO: 8.85 K/UL — SIGNIFICANT CHANGE UP (ref 3.8–10.5)
WBC UR QL: 526 /HPF — HIGH (ref 0–5)

## 2024-10-14 PROCEDURE — 76820 UMBILICAL ARTERY ECHO: CPT | Mod: 26

## 2024-10-14 PROCEDURE — 59025 FETAL NON-STRESS TEST: CPT | Mod: 26,59

## 2024-10-14 PROCEDURE — 99221 1ST HOSP IP/OBS SF/LOW 40: CPT | Mod: 25

## 2024-10-14 PROCEDURE — 76819 FETAL BIOPHYS PROFIL W/O NST: CPT | Mod: 26

## 2024-10-14 PROCEDURE — 99213 OFFICE O/P EST LOW 20 MIN: CPT | Mod: TH,GC,25

## 2024-10-14 RX ORDER — VALACYCLOVIR 1000 MG/1
1000 TABLET ORAL EVERY 12 HOURS
Refills: 0 | Status: DISCONTINUED | OUTPATIENT
Start: 2024-10-14 | End: 2024-10-16

## 2024-10-14 RX ORDER — BETAMETHASONE ACETATE,SOD PHOS 6 MG/ML
12 VIAL (ML) INJECTION EVERY 24 HOURS
Refills: 0 | Status: DISCONTINUED | OUTPATIENT
Start: 2024-10-14 | End: 2024-10-14

## 2024-10-14 RX ORDER — FOLIC ACID 1 MG/1
1 TABLET ORAL DAILY
Refills: 0 | Status: DISCONTINUED | OUTPATIENT
Start: 2024-10-14 | End: 2024-10-14

## 2024-10-14 RX ORDER — PRENATAL VIT,CAL 76/IRON/FOLIC 29 MG-1 MG
1 TABLET ORAL DAILY
Refills: 0 | Status: DISCONTINUED | OUTPATIENT
Start: 2024-10-14 | End: 2024-10-16

## 2024-10-14 RX ORDER — BETAMETHASONE ACETATE,SOD PHOS 6 MG/ML
12 VIAL (ML) INJECTION ONCE
Refills: 0 | Status: COMPLETED | OUTPATIENT
Start: 2024-10-15 | End: 2024-10-15

## 2024-10-14 RX ORDER — FERROUS SULFATE 325(65) MG
325 TABLET ORAL DAILY
Refills: 0 | Status: DISCONTINUED | OUTPATIENT
Start: 2024-10-14 | End: 2024-10-16

## 2024-10-14 RX ORDER — FOLIC ACID 1 MG/1
1 TABLET ORAL DAILY
Refills: 0 | Status: DISCONTINUED | OUTPATIENT
Start: 2024-10-14 | End: 2024-10-16

## 2024-10-14 RX ORDER — FERROUS SULFATE 325(65) MG
325 TABLET ORAL DAILY
Refills: 0 | Status: DISCONTINUED | OUTPATIENT
Start: 2024-10-14 | End: 2024-10-14

## 2024-10-14 RX ORDER — HUMAN RHO(D) IMMUNE GLOBULIN 300 UG/1
1500 INJECTION, SOLUTION INTRAMUSCULAR
Qty: 0 | Refills: 0 | Status: COMPLETED | OUTPATIENT
Start: 2024-10-14

## 2024-10-14 RX ORDER — PRENATAL VIT,CAL 76/IRON/FOLIC 29 MG-1 MG
1 TABLET ORAL DAILY
Refills: 0 | Status: DISCONTINUED | OUTPATIENT
Start: 2024-10-14 | End: 2024-10-14

## 2024-10-14 RX ORDER — VALACYCLOVIR 1000 MG/1
500 TABLET ORAL DAILY
Refills: 0 | Status: DISCONTINUED | OUTPATIENT
Start: 2024-10-14 | End: 2024-10-14

## 2024-10-14 RX ADMIN — VALACYCLOVIR 1000 MILLIGRAM(S): 1000 TABLET ORAL at 18:03

## 2024-10-14 RX ADMIN — Medication 5000 UNIT(S): at 18:04

## 2024-10-14 RX ADMIN — HUMAN RHO(D) IMMUNE GLOBULIN 0 UNIT: 300 INJECTION, SOLUTION INTRAMUSCULAR at 00:00

## 2024-10-14 RX ADMIN — Medication 12 MILLIGRAM(S): at 16:44

## 2024-10-14 NOTE — OB PROVIDER H&P - HISTORY OF PRESENT ILLNESS
LARA HARMON is a 38y  at 30wks GA, pregnancy complicated by di-di twins and cHTN who presents to L&D c/o decreased fetal movement and changes in umbilical artery doppler monitoring in the [facility] high risk clinic today. She was sent in for fetal monitoring.   She currently denies any headaches, vision changes, SOB, RUQ pain, calf tenderness or worsening edema. Of note, patient was seen in Triage on  for elevated BP's and headache, HELLP labs wnl and P/C 0.2.   Pt was started on Labetalol 100mg BID during this pregnancy, was previously seeing Dr. Mario Walls at [facility].    Pt denies vaginal bleeding, contractions and leakage of fluid. Currently feels good fetal movement.  She denies fevers, chills, chest pain, nausea/vomiting/diarrhea, dysuria.    PNC: Regina twins with IUGR of twin B and abnormal dopplers in baby A and B; intermittent AEDV in twin B today; cHTN  Growth 10/11/24 A) VTX THU BPP WNL AC 21% EFW 27% (3lb) B) VTX THU BPP WNL AC <1% EFW <1% (2lb4oz)  -BPP today normal   -abnormal UAD today - fetus A) 97%, B) >97.5% with intermittent AEDV     POB: : FT  @ 39wks/3d, M, 6#7, 2012  G2: FT  @ 39wks/6d, M, 6#5, 2020  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, s/p Valtrex tx; denies other STD hx, denies abnormal PAPs  Myomas (from US on 6/10/2024)   Site                 L(cm)     W(cm)     D(cm)        Posterior            3.51      2.84      2.18   Anterior             2.91      2.53      2.16   Posterior            1.81      1.59      1.36  PMH: cHTN  PSH: L knee surgery   SH: Denies tobacco use, EtOH use and illicit drug use during the pregnancy; Feels safe at home  Meds: Labetalol 100mg BID  All: Latex, RADHADA

## 2024-10-14 NOTE — OB PROVIDER H&P - NSMATERNALFETALCONCERNS_OBGYN_ALL_OB_FT
Maternal/Fetal Alert  AMA , carrier CF , no testing on partner   di di twins ; TWIN B 1%   tx of CARE from [facility] OB CARE TO [facility] SECONDARY TO IUGR of twin B with elevated dopplers   intermittent care ( travel from [facility] )   ALERT NICU   Lisandra Ayala RN 9/30/2024    Maternal/Fetal Alert  AMA , DI DI twins

## 2024-10-14 NOTE — CONSULT NOTE PEDS - CONSULT REQUESTED DATE/TIME
[facility] ambulatory encounter  ORTHOPEDIC OFFICE VISIT    CHIEF COMPLAINT:  Shoulder (follow up left shoulder pain )      SUBJECTIVE:  Julio Cesar Arreola is a 87 year old male who presented requesting evaluation for his left shoulder.  He states that his shoulder is getting better, and he has more flexibility.  He has some soreness in the shoulder after sleeping, but that improves after moving it around.  The only pain that he feels is in the area of the deltoid muscle.      PROBLEM LIST:  Patient Active Problem List   Diagnosis   • Unspecified hypothyroidism   • Other and unspecified hyperlipidemia   • Dermatophytosis of the body   • Allergic rhinitis, cause unspecified   • Essential hypertension, benign   • Duodenitis without mention of hemorrhage   • Malignant neoplasm of prostate (CMS/HCC)   • Generalized anxiety disorder   • Dysphagia, unspecified(787.20)   • Nontraumatic rupture of tendons of biceps (long head)   • Renal failure, unspecified   • GERD (gastroesophageal reflux disease)   • Memory problem   • Hyperparathyroidism, secondary renal (CMS/HCC)       HISTORIES:  I have reviewed the past medical history, family history, social history, medications and allergies listed in the medical record as obtained by my nursing staff and support staff and agree with their documentation.    Review of systems:  Constitutional: Negative for fever and chills.   Skin: Negative for rash.   HEENT: Negative for eye drainage, rhinorrhea, ear pain, sore throat.  Respiratory: Negative cough, wheezing or shortness of breath.    Cardiovascular: Negative for chest pain, chest pressure or palpitations.   Gastrointestinal: Negative for nausea, vomiting, diarrhea.   Genitourinary: Negative for dysuria, urgency, frequency or hematuria.  Neurologic:  Negative for change in sensory or motor function.   Endocrine: Negative for heat or cold intolerance.  Psychiatric: Negative for change in mood or mentation.  Extremities:  All other systems are  14-Oct-2024 17:00 reviewed and are negative except as documented in the HPI.      OBJECTIVE:  Physical ExamINATION:  Vitals: There were no vitals taken for this visit.  Constitutional:  Well-developed, well-nourished male in no acute distress.  Skin: Warm, dry, intact without rash or lesion.  Neurologic:  Alert and oriented x3.  Musculoskeletal:  On examination today, his internal and external rotation strength is symmetric, as is his abduction.    LAB RESULTS:  No laboratory results for this encounter were reviewed.    IMAGING STUDIES:  No imaging studies were reviewed.    ASSESSMENT:  1. Shoulder arthritis        PLAN:  I advised Julio Cesar to ice the shoulder for pain relief.  I recommended that he keep his hands low and close to his body with activities to avoid a sore shoulder.  I advised him to sleep with a pillow under his arm when sleeping.  All questions were answered.    I will see him again for reevaluation on an as-needed basis.      No orders of the defined types were placed in this encounter.      No Follow-up on file.    Instructions provided as documented in the after visit summary.    The patient indicated understanding of the diagnosis and agreed with the plan of care.    On 7/12/2018, Araceli VASQUEZ scribed the services personally performed by Neil Loyd MD    The documentation recorded by the scribe accurately and completely reflects the service(s) I personally performed and the decisions made by me.         cc:  Julio Cesar Meehan M.D.

## 2024-10-14 NOTE — OB PROVIDER TRIAGE NOTE - NSMATERNALFETALCONCERNS_OBGYN_ALL_OB_FT
Maternal/Fetal Alert  AMA , carrier CF , no testing on partner   di di twins ; TWIN B 1%   tx of CARE from [facility] OB CARE TO [facility] SECONDARY TO IUGR of twin B with elevated dopplers   intermittent care ( travel from [facility] )   ALERT NICU   Lisandra Ayala RN 9/30/2024    Maternal/Fetal Alert  AMA , DI DI twins

## 2024-10-14 NOTE — OB PROVIDER H&P - TIME BILLING
Patient care required review of chart (vitals, labs, images, documentation, etc.), evaluation/counseling patient, and coordination of care.

## 2024-10-14 NOTE — OB RN PATIENT PROFILE - NSMATERNALFETALCONCERNS_OBGYN_ALL_OB_FT
Maternal/Fetal Alert  AMA , carrier CF , no testing on partner   di di twins ; TWIN B 1%   tx of CARE from [facility] OB CARE TO [facility] SECONDARY TO IUGR of twin B with elevated dopplers   intermittent care ( travel from [facility] )   ALERT NICU   Lisandra Ayala RN 9/30/2024    Maternal/Fetal Alert  AMA , DI DI twins

## 2024-10-14 NOTE — OB PROVIDER H&P - ATTENDING COMMENTS
MFM Attending Note  Patient seen at bedside  Patient presented from clinic secondary to change in fetal dopplers.  Patient had reported change in fetal movement but now reported fetal movement of both babies.    ICU Vital Signs Last 24 Hrs  T(C): 36.7 (14 Oct 2024 18:37), Max: 36.7 (14 Oct 2024 13:49)  T(F): 98.06 (14 Oct 2024 18:37), Max: 98.1 (14 Oct 2024 13:49)  HR: 85 (14 Oct 2024 18:52) (66 - 90)  BP: 136/63 (14 Oct 2024 18:52) (111/67 - 136/63)  BP(mean): --  ABP: --  ABP(mean): --  RR: 16 (14 Oct 2024 18:37) (16 - 16)  SpO2: --    EFM: Twin A- 135, moderate variability, + accel, - decel (some lose of contact)  Twin B- 135, moderate variability, + accel, -decel  toco: rare contraction                          10.8   8.85  )-----------( 265      ( 14 Oct 2024 15:23 )             32.9   10-14    135  |  102  |  7   ----------------------------<  97  3.8   |  21[L]  |  0.51    Ca    8.9      14 Oct 2024 15:23    TPro  6.8  /  Alb  3.6  /  TBili  <0.2  /  DBili  x   /  AST  11  /  ALT  7   /  AlkPhos  119  10-14  Urinalysis Basic - ( 14 Oct 2024 15:23 )    Color: Yellow / Appearance: Turbid / S.024 / pH: x  Gluc: 97 mg/dL / Ketone: Negative mg/dL  / Bili: Negative / Urobili: 1.0 mg/dL   Blood: x / Protein: 30 mg/dL / Nitrite: Negative   Leuk Esterase: Large / RBC: 2 /HPF /  /HPF   Sq Epi: x / Non Sq Epi: 111 /HPF / Bacteria: Many /HPF    a/p:  38 y.o.  at 30 weeks  # Di/di twin gestation  # Twin B with fetal growth restriction  # New onset doppler abnormalities today (intermittent AEDF twin B, elevated twin A)  # Chronic hypertension, diagnosed at early pregnancy care  # HSV lesion in earlier pregnancy  # Fibroids  # Anemia  # Prior vaginal deliveries  # AMA    Twin gestation with twin B growth restriction and new dopler abnormalities: The new findings today prompted evaluation in the hospital. Patient also had reported change in fetal movement which has since returned to normal. Patient will receive betamethasone since doppler changes and increased risk of  delivery. NICU consult to discuss care with  delivery. Since tracing over more than 2 hours reassuring will transition to intermittent monitoring with NST TID. Magnesium will be held at this time unless concern for delivery increases. GBS will be sent, no antibiotic coverage need at this time unless change in clinical status. Likely repeat doppler testing tomorrow. Can transfer to antepartum unit if bed availability.     Hypertension: currently normotensive with normal labs; had been given urine collection in the clinic will start 24 hour urine since P/C 0.6 to evaluate if elevated. Taking labetalol 100 mg BID at home    HSV in pregnancy: s/p treatment; asymptomatic; will restart valtrex while in hospital in case of delivery; would need speculum exam if any concerns for labor    Continue in hospital care at this time. If concern for delivery would type/cross since anemic with twin gestation and would need additional IV access. Patient understanding of plan of care and willing to stay in the hospital at this time. Patient did request social work care to assist with transportation- which can see patient tomorrow.

## 2024-10-14 NOTE — OB PROVIDER H&P - ASSESSMENT
38 yof  @ 30w GA with di-di twins, preg c/b cHTN on Lab 100 BID, and evidence of IUGR in twin B, presenting today with doppler changes and subjective c/o decreased fetal movement. UAD today shows intermittent AEDV in twin B. Admitted for prolonged monitoring of fetuses, BP monitoring, and for BMZ. Currently asymptomatic and without concerns for PTL.       #Fetal wellbeing  - BMZ/Monitoring as above  - NICU consult placed  - f/u GBS      #cHTN  - AM HELLP labs  - Monitor BPs  - 24 hour urine protein  - BMZ for FLM      #Maternal wellbeing  - Regular diet  - HSQ/SCDs for DVT ppx  - PNV/Iron/Colace/Folic acid  - f/u UCx    Seen with CONCEPCIÓN Ramirez Attending  Kelvin Rojas, PGY3

## 2024-10-14 NOTE — OB RN PATIENT PROFILE - SPIRITUAL CULTURAL, CURRENT SITUATION, PROFILE
Congratulations on the birth of your baby! Follow-up with your pediatrician within 2-5 days or sooner if recommended. If enrolled in the [facility]  program, your infants crib card may be required for your first visit. INFANT CARE  Use the bulb syringe to remove nasal drainage and spit-up. The umbilical cord will fall off within approximately 2 weeks. Do not apply alcohol or pull it off. Until the cord falls off and has healed avoid getting the area wet; the baby should be given sponge baths, no tub baths. Change diapers frequently and keep the diaper area clean to avoid diaper rash. You may sponge bathe the baby every other day, provide a warm area during the bath, free from drafts. You may use baby products, do not use powder. Dress the baby according to the weather. Typically infants need one additional layer of clothing than adults. Burp the infant frequently during feedings. Wash females front to back. Girl babies may have vaginal discharge that may even have a slight blood tinged color. This is normal.  Boy babies with circumcision may have small amounts of bloody drainage or yellow drainage in the diaper. This is normal. Generous amounts of vaseline to the circumcision for the first 3-4 days. May wash with bath on 3-4th day. Position the baby on his / her back to sleep. Infants should spend some time on their belly often throughout the day when awake and if an adult is close by; this helps the infant develop muscle & neck control. INFANT FEEDING  Bottle: To prepare formula follow the manufacturers instructions. Keep bottles and nipples clean. DO NOT reuse formula from a bottle used for a previous feeding. Formula is typically only good for ONE hour after the baby begins to eat from the bottle. When bottle feeding, hold the baby in an upright position. DO NOT prop a bottle to feed the baby.       Only use pre mixed liquid formula or powder formula mixed with boiled cooled water for the first 4 months of life because powdered infant formula milk is not sterile. Even though tins and packets of milk powder are sealed, they can still contain bacteria. Water that hasn't been boiled can also contain bacteria. Formula therefore needs to be made up with water hot enough to kill the bacteria, which is at least 70 degrees C. Breast:  When breast feeding, get in a comfortable position sitting or lying on your side. Newborns will eat about every 2-4 hours. Allow no longer than 5 hours between feedings at night. Be alert to early hunger cues. Infants should total about 8 feedings in each 24 hour period. Diapers  Expect 1 wet diaper for each day of life. 1 on day 1, 2 on day 2, 3 on day 3 and so on. Bottle fed babies will usually have more wet and dirty diapers than a breast fed baby in the first few days. Babies should have 6-8 wet diapers and 2 or more stool diapers per day after the first week. INFANT SAFETY  Wash hands frequently, especially after diaper changes. When in a car, newborns need to ride in an appropriate car seat, rear facing, in the back seat. It is advisable to not 'advertise' that you have a new baby in the house. DO NOT smoke near a baby. DO NOT sleep with baby in bed with you. Pacifiers should be replaced every three months. NEVER SHAKE A BABY!! Never leave your baby with a stranger or unattended. WHEN TO CALL THE DOCTOR  If the baby's temp is greater than 100.4. If the baby is having forceful vomiting, green colored vomit, high pitched crying, or is constantly restless and very irritable. If the baby has a rash lasting longer than three days. If the baby has diarrhea, waterless stools, or is constipated (hard pellets or no bowel movement for greater than 3 days). If the baby has bleeding, swelling, drainage, or an odor from the umbilical cord or a red [place] around the base of the cord.   If the baby has a yellow color to his/her skin or to the whites of the eyes. If the baby has become blue around the mouth when crying or feeding. If the baby has frequent yellowish eye drainage. If you are unable to arouse or wake your baby. If your baby has white patches in the mouth or a bright red diaper rash. If your infant does not want to wake to eat and has had less than 6 wet diapers in a day. OR for any other concerns you may have for your infant. CALL 911 If your infant becomes blue or has trouble breathing. Please refer to the  \"Your Guide To Postpartum and Jbphh Care\" Booklet for more details on caring for your baby & yourself. I have received the following: Caring for Yourself and Your Baby Booklet; Why must my baby be screened (PKU); Screening for Infantile Krabbe disease; Pertussis; Chicken Pox; All Kids Need Hepatitis B Shots! - [employer]; Smoking Cessation; The Facts About Secondhand Smoke; Victim of abuse information; Hepatitis B Vaccine information sheet; Baby Safe, anti shaking certificate; Babies cry a lot. It's normal; Kids Get Flu, Too! Protect Yours!; Group B Strep in the Jbphh (if applicable); Jaundice in  Babies; RSV; Basic  Care at Discharge; Car Seat Safety for Newborns; Parent's Guide to Immunizations; Feeding infant formula information sheet; Safety Tips for Sleeping Babies; and Immunization Record Book. Discharged home with mother. Discharge teaching complete, discharge instructions signed, & parent/guardian denies questions regarding infant care at time of discharge. Mother verbalized understanding to follow-up with the pediatrician or family physician as recommended on the discharge instructions. Discharged in stable condition to care of parent. Placed in car seat per mother. ID bands verified before discharge with mother and RN. Security band removed. denies

## 2024-10-14 NOTE — OB PROVIDER H&P - NSTOBACCOSCREENHP_GEN_A_NCS
Dear Ms. Jones,    I wanted to follow up on your recent test results:    Cholesterol is high; I would encourage healthy food choices and regular exercise before starting medications for this.   Other labs are stable including thyroid and vitamin D. No

## 2024-10-14 NOTE — OB PROVIDER TRIAGE NOTE - HISTORY OF PRESENT ILLNESS
LARA HARMON is a 38y  at 30wks GA who presents to L&D for elevated BPs in the office today. She was sent in for 24 hour monitoring. She currently denies any headaches, vision changes, SOB, RUQ pain, calf tenderness or worsening edema. Pt was started on Labetalol 100mg BID during this pregnancy, was previously seeing Dr. Mario Walls at [facility].    Pt denies vaginal bleeding, contractions and leakage of fluid. She endorses good fetal movement.   She denies fevers, chills, chest pain, nausea/vomiting/diarrhea, dysuria.        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, s/p Valtrex tx; denies other STD hx, denies abnormal PAPs  PMH: cHTN  PSH: L knee surgery   SH: Denies tobacco use, EtOH use and illicit drug use during the pregnancy; Feels safe at home  Meds: Labetalol 100mg BID  All: Latex, KNDA    T(C): --  HR: 71 (10-14-24 @ 15:10) (70 - 72)  BP: 119/61 (10-14-24 @ 15:10) (115/62 - 119/61)  RR: --  SpO2: --  Gen: NAD, well-appearing  Heart: RRR  Lungs: CTAB  Abd: soft, gravid  Ext: non-edematous, non-tender   SVE: deferred  SSE: cervix visualized, closed and without any signs of bleeding or drainage, no pooling   FHT:   [place]:    A/P:     Fetus:  [place]:  Dispo: Continue to observe.     Discussed with Dr. Thalia Arnold, PGY-1 LARA HARMON is a 38y  at 30wks GA who presents to L&D for elevated BPs in the office today. She was sent in for 24 hour monitoring. She currently denies any headaches, vision changes, SOB, RUQ pain, calf tenderness or worsening edema. Pt was started on Labetalol 100mg BID during this pregnancy, was previously seeing Dr. Mario Walls at [facility].    Pt denies vaginal bleeding, contractions and leakage of fluid. She endorses good fetal movement.   She denies fevers, chills, chest pain, nausea/vomiting/diarrhea, dysuria.        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, s/p Valtrex tx; denies other STD hx, denies abnormal PAPs  PMH: cHTN  PSH: L knee surgery   SH: Denies tobacco use, EtOH use and illicit drug use during the pregnancy; Feels safe at home  Meds: Labetalol 100mg BID  All: Latex, KNDA    T(C): --  HR: 71 (10-14-24 @ 15:10) (70 - 72)  BP: 119/61 (10-14-24 @ 15:10) (115/62 - 119/61)  RR: --  SpO2: --  Gen: NAD, well-appearing  Heart: RRR  Lungs: CTAB  Abd: soft, gravid  Ext: non-edematous, non-tender   SVE: deferred  FHT:   [place]: irregular contractions     LARA HARMON is a 38y  at 30wks GA who presents to L&D for elevated BPs in the office today. She was sent in for 24 hour monitoring. She currently denies any headaches, vision changes, SOB, RUQ pain, calf tenderness or worsening edema. Of note, patient was seen in Triage on  for elevated BP's and headache, HELLP labs wnl and P/C 0.2. Pt was started on Labetalol 100mg BID during this pregnancy, was previously seeing Dr. Mario Walls at [facility].    Pt denies vaginal bleeding, contractions and leakage of fluid. She endorses good fetal movement.   She denies fevers, chills, chest pain, nausea/vomiting/diarrhea, dysuria.      POB: : FT  @ 39wks/3d, M, 6#7, 2012  G2: FT  @ 39wks/6d, M, 6#5, 2020  G2: Di-Di TIUP, PNC complicated by cHTN and HSV-2 outbreak on .  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, s/p Valtrex tx; denies other STD hx, denies abnormal PAPs  Myomas (from US on 6/10/2024)   Site                 L(cm)     W(cm)     D(cm)        Posterior            3.51      2.84      2.18   Anterior             2.91      2.53      2.16   Posterior            1.81      1.59      1.36  PMH: cHTN  PSH: L knee surgery   SH: Denies tobacco use, EtOH use and illicit drug use during the pregnancy; Feels safe at home  Meds: Labetalol 100mg BID  All: Latex, KNDA    T(C): --  HR: 71 (10-14-24 @ 15:10) (70 - 72)  BP: 119/61 (10-14-24 @ 15:10) (115/62 - 119/61)  RR: --  SpO2: --  Gen: NAD, well-appearing  Heart: RRR  Lungs: CTAB  Abd: soft, gravid  Ext: non-edematous, non-tender   SVE: deferred  FHT:   [place]: irregular contractions

## 2024-10-14 NOTE — OB PROVIDER H&P - NSHPPHYSICALEXAM_GEN_ALL_CORE
Objective  – VS  T(C): 36.7 (10-14-24 @ 13:49)  HR: 67 (10-14-24 @ 16:41)  BP: 121/62 (10-14-24 @ 16:41)  RR: 16 (10-14-24 @ 13:49)  SpO2: --    Physical Exam  CV: RRR  Pulm: breathing comfortably on RA  Abd: gravid, nontender  Extr: moving all extremities with ease

## 2024-10-14 NOTE — OB PROVIDER TRIAGE NOTE - NSOBPROVIDERNOTE_OBGYN_ALL_OB_FT
A/P:  39 yo  at 30wks with a di-di TIUP and cHTN in this pregnancy on Labetalol 100mg BID who was referred from [facility] for elevated BP's, HELLP labs and 24 hour monitoring.  Fetus:  [place]: irregular contractions  Dispo: [place] for 24 hours    Discussed with Dr. Thalia Arnold, PGY-1

## 2024-10-14 NOTE — OB PROVIDER H&P - CURRENT PREGNANCY COMPLICATIONS, OB PROFILE
Multiple Gestation/Abnormal Fetal Surveillance/Gestational Age less than 36 Weeks/Hypertensive Disorder/Intrauterine Growth Restriction

## 2024-10-14 NOTE — OB PROVIDER H&P - NSHPLABSRESULTS_GEN_ALL_CORE
10.8   8.85  )-----------( 265      ( 10-14 @ 15:23 )             32.9     10-14 @ 15:23    135  |  102  |  7   ----------------------------<  97  3.8   |  21  |  0.51    Ca    8.9      10-14 @ 15:23    TPro  6.8  /  Alb  3.6  /  TBili  <0.2  /  DBili  x   /  AST  11  /  ALT  7   /  AlkPhos  119  10-14 @ 15:23        Uric Acid: (10-14 @ 15:23)  4.0      Fibrinogen: (10-14 @ 15:23)  --       LDH: (10-14 @ 15:23)  167

## 2024-10-14 NOTE — CONSULT NOTE PEDS - SUBJECTIVE AND OBJECTIVE BOX
Ms. Bajwa is a 39 y/o   adtmitted at 30.1 weeks gestation with pregancy complicated by di-di twins. Mother currently admitted for prolonged monitoring in the setting of IUGR of twin B with intermittent AEDV in twin B. Maternal history also notable for cHTN. Most recent EFW 3lb (27th percentile) for twin A and 2lbs 4oz (less than 1st percentile) for twin B.    NICU consulted to discuss what to expect if she were to deliver at 30 weeks gestation.    I met with Ms. Bajwa  and we reviewed the followin. The NICU team will be present at her delivery and will immediately assess and care for her infant.    2. Due to prematurity, the infant may require respiratory support, most commonly in the form of CPAP. The outcomes improve after  steroids. Less commonly, some infants at this gestational age will require intubation and mechanical ventilation with surfactant administration.    3. Depending on the clinical status of the infant, enteral feedings may not be started immediately. IV nutrition in the form of TPN would be provided via umbilical line or PICC until the infant is able to tolerate full enteral feedings. Due to immature suck/swallow, she may require an orogastric tube once feeds are initiated. She is also at risk for hypoglycemia.    4. Discussed the benefits of breastfeeding in  infants and encouraged mother to pump following delivery.    5. Due to prematurity, the infant will be at increased risk for infection and would likely be started on antibiotics after birth. The infant will be screened for infections following delivery and may require other courses of antibiotics during their hospital course if an infection were suspected. If the infant shows signs and symptoms of feeding intolerance, feedings may be held, and the infant may require evaluation for intestinal infection (necrotizing enterocolitis).    6. Need for possible blood transfusions, jaundice, phototherapy discussed.    7. Small risk of IVH discussed.    8. Small risk of symptomatic PDA discussed with possible need for medical vs. transcatheter closure.    9. ROP risk discussed along with close follow up with ophthalmologist.    10. The infant is at risk for developmental delays as a consequence of prematurity. The infant will be evaluated by a developmental pediatrician to monitor for neurodevelopmental delays.    11. Thermoregulation issues and need to be in an isolette with slow weaning to a crib discussed.    12. Length of stay is highly variable, but given the infant’s size and gestational age, average stay is approximately 5-6 weeks. Discussed discharged criteria.    Ms. Mendez had the chance to ask any questions and was encouraged to contact the NICU at that time if additional questions arise.    Thank you for the opportunity to participate in the care of this patient and please inform us of any changes in her status.

## 2024-10-15 ENCOUNTER — NON-APPOINTMENT (OUTPATIENT)
Age: 39
End: 2024-10-15

## 2024-10-15 ENCOUNTER — APPOINTMENT (OUTPATIENT)
Dept: ANTEPARTUM | Facility: CLINIC | Age: 39
End: 2024-10-15
Payer: MEDICAID

## 2024-10-15 ENCOUNTER — ASOB RESULT (OUTPATIENT)
Age: 39
End: 2024-10-15

## 2024-10-15 DIAGNOSIS — R79.89 OTHER SPECIFIED ABNORMAL FINDINGS OF BLOOD CHEMISTRY: ICD-10-CM

## 2024-10-15 LAB
COLLECT DURATION TIME UR: 24 HR — SIGNIFICANT CHANGE UP
PROT 24H UR-MRATE: 96 MG/24 H — SIGNIFICANT CHANGE UP
T PALLIDUM AB TITR SER: NEGATIVE — SIGNIFICANT CHANGE UP
TOTAL VOLUME - 24 HOUR: 350 ML — SIGNIFICANT CHANGE UP
URINE CREATININE CALCULATION: 0.4 G/24 H — LOW (ref 0.6–1.6)

## 2024-10-15 PROCEDURE — 76819 FETAL BIOPHYS PROFIL W/O NST: CPT | Mod: 26,59

## 2024-10-15 PROCEDURE — 99233 SBSQ HOSP IP/OBS HIGH 50: CPT | Mod: GC,25

## 2024-10-15 PROCEDURE — 76820 UMBILICAL ARTERY ECHO: CPT | Mod: 26

## 2024-10-15 RX ORDER — LABETALOL HYDROCHLORIDE 200 MG/1
100 TABLET, FILM COATED ORAL EVERY 12 HOURS
Refills: 0 | Status: DISCONTINUED | OUTPATIENT
Start: 2024-10-15 | End: 2024-10-15

## 2024-10-15 RX ORDER — MULTIVIT-MIN/IRON/FOLIC ACID/K 18-600-40
50 MCG CAPSULE ORAL
Qty: 30 | Refills: 4 | Status: ACTIVE | COMMUNITY
Start: 2024-10-15 | End: 1900-01-01

## 2024-10-15 RX ADMIN — FOLIC ACID 1 MILLIGRAM(S): 1 TABLET ORAL at 11:30

## 2024-10-15 RX ADMIN — Medication 12 MILLIGRAM(S): at 15:28

## 2024-10-15 RX ADMIN — Medication 5000 UNIT(S): at 22:37

## 2024-10-15 RX ADMIN — Medication 1 TABLET(S): at 11:30

## 2024-10-15 RX ADMIN — VALACYCLOVIR 1000 MILLIGRAM(S): 1000 TABLET ORAL at 18:26

## 2024-10-15 RX ADMIN — Medication 325 MILLIGRAM(S): at 11:31

## 2024-10-15 RX ADMIN — VALACYCLOVIR 1000 MILLIGRAM(S): 1000 TABLET ORAL at 06:30

## 2024-10-15 RX ADMIN — Medication 5000 UNIT(S): at 11:31

## 2024-10-15 NOTE — PROGRESS NOTE ADULT - ASSESSMENT
38 yof  @ 30w1d GA with di-di twins, preg c/b cHTN on Lab 100 BID, and evidence of IUGR in twin B, presenting today with doppler changes and subjective c/o decreased fetal movement. UAD today shows intermittent AEDV in twin B. Admitted for prolonged monitoring of fetuses, BP monitoring, and for BMZ. Currently asymptomatic and without concerns for PTL.       #Fetal wellbeing  - BMZ (10/14-)  - NST BID  - NICU consult placed  - f/u GBS (10/14)      #cHTN  - AM HELLP labs  - Monitor BPs  - 24 hour urine protein  - lab 100 BID      #Maternal wellbeing  - Regular diet  - HSQ/SCDs for DVT ppx  - PNV/Iron/Colace/Folic acid  - f/u UCx    Kelvin Rojas, PGY3  38 yof  @ 30w1d GA with di-di twins, preg c/b cHTN on Lab 100 BID, and evidence of IUGR in twin B, presenting today with doppler changes and subjective c/o decreased fetal movement. UAD today shows intermittent AEDV in twin B. Admitted for prolonged monitoring of fetuses, BP monitoring, and for BMZ. Currently asymptomatic and without concerns for PTL.       #Fetal wellbeing  - BMZ (10/14-)  - NST BID  - NICU consult placed  - f/u GBS (10/14)      #cHTN vs gHTN  - AM HELLP labs  - Monitor BPs  - 24 hour urine protein  - hold Lab 100 BID and monitor BPs      #Maternal wellbeing  - Regular diet  - HSQ/SCDs for DVT ppx  - PNV/Iron/Colace/Folic acid  - f/u UCx    Kelvin Rojas, PGY3

## 2024-10-15 NOTE — PROGRESS NOTE ADULT - ATTENDING COMMENTS
Patient with LUIS CARLOS TIUP and severe FGR of baby B, newly diagnosed intermittent AEDF on yesterday's scan and decreased fetal movement admitted for fetal monitoring and BMZ.  Fetal status reassuring x 2 and Doppler WNL/elevated today without evidence of AEDF.  Although candidate for outpatient monitoring from fetal perspective, the patient has been placed on labetalol in September by prior OB for unclear reasons although seemingly concern for GHTN.  Patient has felt tired and unwell since being on it.  Denies any CHTN and reports regular PCP checkups prior to pregnancy.  Will d/c labetalol since if severe range BPs, diagnosis of severe PEC can be made with change in management.  IF BPs remain normal or mild range off labetalol can be discharged to home tomorrow with very close outpatient monitoring and delivery by 37 weeks or earlier if clinically indicated based on maternal of fetal status.

## 2024-10-16 ENCOUNTER — TRANSCRIPTION ENCOUNTER (OUTPATIENT)
Age: 39
End: 2024-10-16

## 2024-10-16 ENCOUNTER — NON-APPOINTMENT (OUTPATIENT)
Age: 39
End: 2024-10-16

## 2024-10-16 ENCOUNTER — ASOB RESULT (OUTPATIENT)
Age: 39
End: 2024-10-16

## 2024-10-16 ENCOUNTER — APPOINTMENT (OUTPATIENT)
Dept: ANTEPARTUM | Facility: CLINIC | Age: 39
End: 2024-10-16
Payer: MEDICAID

## 2024-10-16 VITALS
DIASTOLIC BLOOD PRESSURE: 69 MMHG | OXYGEN SATURATION: 100 % | HEART RATE: 72 BPM | TEMPERATURE: 98 F | SYSTOLIC BLOOD PRESSURE: 92 MMHG | RESPIRATION RATE: 15 BRPM

## 2024-10-16 LAB
GAMMA INTERFERON BACKGROUND BLD IA-ACNC: 0.03 IU/ML — SIGNIFICANT CHANGE UP
GROUP B BETA STREP DNA (PCR): DETECTED
LEAD BLD-MCNC: <1 UG/DL — SIGNIFICANT CHANGE UP (ref 0–3.4)
M TB IFN-G BLD-IMP: NEGATIVE — SIGNIFICANT CHANGE UP
M TB IFN-G CD4+ BCKGRND COR BLD-ACNC: 0 IU/ML — SIGNIFICANT CHANGE UP
M TB IFN-G CD4+CD8+ BCKGRND COR BLD-ACNC: 0 IU/ML — SIGNIFICANT CHANGE UP
QUANT TB PLUS MITOGEN MINUS NIL: 1.24 IU/ML — SIGNIFICANT CHANGE UP
SOURCE GROUP B STREP: SIGNIFICANT CHANGE UP

## 2024-10-16 PROCEDURE — 76819 FETAL BIOPHYS PROFIL W/O NST: CPT | Mod: 26

## 2024-10-16 PROCEDURE — 99239 HOSP IP/OBS DSCHRG MGMT >30: CPT | Mod: GC,25

## 2024-10-16 PROCEDURE — 76820 UMBILICAL ARTERY ECHO: CPT | Mod: 26

## 2024-10-16 RX ORDER — PRENATAL VIT,CAL 76/IRON/FOLIC 29 MG-1 MG
1 TABLET ORAL
Qty: 0 | Refills: 0 | DISCHARGE
Start: 2024-10-16

## 2024-10-16 RX ORDER — LABETALOL HYDROCHLORIDE 200 MG/1
1 TABLET, FILM COATED ORAL
Refills: 0 | DISCHARGE

## 2024-10-16 RX ORDER — PRENATAL VIT,CAL 76/IRON/FOLIC 29 MG-1 MG
0 TABLET ORAL
Refills: 0 | DISCHARGE

## 2024-10-16 RX ORDER — FOLIC ACID 1 MG/1
1 TABLET ORAL
Qty: 0 | Refills: 0 | DISCHARGE
Start: 2024-10-16

## 2024-10-16 RX ORDER — FERROUS SULFATE 325(65) MG
1 TABLET ORAL
Qty: 0 | Refills: 0 | DISCHARGE
Start: 2024-10-16

## 2024-10-16 RX ADMIN — VALACYCLOVIR 1000 MILLIGRAM(S): 1000 TABLET ORAL at 07:04

## 2024-10-16 NOTE — DISCHARGE NOTE ANTEPARTUM - PLAN OF CARE
-Take blood pressure with at home cuff; if BP is >140/90 call MD  - Return to hospital with headaches, visual changes, abdominal pain, nausea, vomiting, chest pain or shortness of breathe  - Follow up with OB Monday 10/21 - Follow up with OB clinic 10/21a 1pm  - Return to hospital with contractions, vaginal bleeding, leaking fluid or decreased fetal movement -Follow up with  Testing unit on Friday 10/18   -Follow up with OB clinic 10/21 at 1pm  - Return to hospital with contractions, vaginal bleeding, leaking fluid or decreased fetal movement -Follow up with OB clinic on Friday 10/18/2024 at 1pm   -Follow up with OB clinic 10/21 at 10:30am  -Return to hospital with contractions, vaginal bleeding, leaking fluid or decreased fetal movement -Take blood pressure with at home cuff; if BP is >140/90 call MD  - Return to hospital with headaches, visual changes, abdominal pain, nausea, vomiting, chest pain or shortness of breathe  - Follow up with OB clinic Monday 10/21

## 2024-10-16 NOTE — DISCHARGE NOTE ANTEPARTUM - CARE PROVIDERS DIRECT ADDRESSES
,DirectAddress_Unknown,[email or web address]

## 2024-10-16 NOTE — DISCHARGE NOTE ANTEPARTUM - ADDITIONAL INSTRUCTIONS
- Follow up with OB on Monday 10/21 - Follow up with OB clinic at [facility] for fetal testing on Friday 10/18 at   - Follow up with OB on Monday 10/21 - Follow up with OB clinic at [facility] for fetal testing on Friday 10/18 at 1pm  - Follow up with OB on Monday 10/21 at 10:30am - Follow up with OB clinic at [facility] for fetal testing on Friday 10/18 at 1pm  - Follow up with OB clinic on Monday 10/21 at 10:30am

## 2024-10-16 NOTE — DISCHARGE NOTE ANTEPARTUM - CARE PLAN
Principal Discharge DX:	IUGR,   Assessment and plan of treatment:	- Follow up with OB clinic 10/21a 1pm  - Return to hospital with contractions, vaginal bleeding, leaking fluid or decreased fetal movement  Secondary Diagnosis:	Hypertension  Assessment and plan of treatment:	-Take blood pressure with at home cuff; if BP is >140/90 call MD  - Return to hospital with headaches, visual changes, abdominal pain, nausea, vomiting, chest pain or shortness of breathe  - Follow up with OB Monday 10/21   1 Principal Discharge DX:	IUGR,   Assessment and plan of treatment:	-Follow up with  Testing unit on Friday 10/18   -Follow up with OB clinic 10/21 at 1pm  - Return to hospital with contractions, vaginal bleeding, leaking fluid or decreased fetal movement  Secondary Diagnosis:	Hypertension  Assessment and plan of treatment:	-Take blood pressure with at home cuff; if BP is >140/90 call MD  - Return to hospital with headaches, visual changes, abdominal pain, nausea, vomiting, chest pain or shortness of breathe  - Follow up with OB Monday 10/21   Principal Discharge DX:	IUGR,   Assessment and plan of treatment:	-Follow up with OB clinic on Friday 10/18/2024 at 1pm   -Follow up with OB clinic 10/21 at 10:30am  -Return to hospital with contractions, vaginal bleeding, leaking fluid or decreased fetal movement  Secondary Diagnosis:	Hypertension  Assessment and plan of treatment:	-Take blood pressure with at home cuff; if BP is >140/90 call MD  - Return to hospital with headaches, visual changes, abdominal pain, nausea, vomiting, chest pain or shortness of breathe  - Follow up with OB Monday 10/21   Principal Discharge DX:	IUGR,   Assessment and plan of treatment:	-Follow up with OB clinic on Friday 10/18/2024 at 1pm   -Follow up with OB clinic 10/21 at 10:30am  -Return to hospital with contractions, vaginal bleeding, leaking fluid or decreased fetal movement  Secondary Diagnosis:	Hypertension  Assessment and plan of treatment:	-Take blood pressure with at home cuff; if BP is >140/90 call MD  - Return to hospital with headaches, visual changes, abdominal pain, nausea, vomiting, chest pain or shortness of breathe  - Follow up with OB clinic Monday 10/21

## 2024-10-16 NOTE — DISCHARGE NOTE ANTEPARTUM - PATIENT PORTAL LINK FT
You can access the FollowMyHealth Patient Portal offered by [facility] by registering at the following website: [email or web address]. By joining [employer]’s FollowMyHealth portal, you will also be able to view your health information using other applications (apps) compatible with our system.

## 2024-10-16 NOTE — DISCHARGE NOTE ANTEPARTUM - CARE PROVIDER_API CALL
Fernando Liu NP in [facility]om[facility]  [id number] [place] [id number]  Phone: [phone]  Fax: [phone]  Follow Up Time:     Karen Steward  Maternal/Fetal Medicine  [address], [address]  [place] [address]  Phone: [phone]  Fax: [phone]  Follow Up Time:

## 2024-10-16 NOTE — DISCHARGE NOTE ANTEPARTUM - MEDICATION SUMMARY - MEDICATIONS TO STOP TAKING
I will STOP taking the medications listed below when I get home from the hospital:    labetalol 100 mg oral tablet  -- 1 tab(s) by mouth 2 times a day

## 2024-10-16 NOTE — DISCHARGE NOTE ANTEPARTUM - HOSPITAL COURSE
38 yof  @ 30w2d GA with di-di twins, preg c/b cHTN previously on Lab 100 BID, and evidence of IUGR in twin B, presenting today with doppler changes and subjective c/o decreased fetal movement. UAD today shows intermittent AEDV in twin B. Admitted for prolonged monitoring of fetuses, BP monitoring, and for BMZ. Currently asymptomatic and without concerns for PTL. Sono yesterday without evidence of UAD abnormalities in both Twin A and B. Labetalol currently being held as reasons for starting unclear and to not mask any potential severe BPs as diagosis of PEC/sPEC will . Patient without elevated BPs overnight even off of anti-hypertensives.

## 2024-10-16 NOTE — DISCHARGE NOTE ANTEPARTUM - NS MD DC FALL RISK RISK
For information on Fall & Injury Prevention, visit: [email or web address] OR  [email or web address] OR  https://www.cdc.gov/steadi/patient.html

## 2024-10-16 NOTE — DISCHARGE NOTE ANTEPARTUM - FINANCIAL ASSISTANCE
[facility] provides services at a reduced cost to those who are determined to be eligible through [facility]’s financial assistance program. Information regarding [facility]’s financial assistance program can be found by going to [email or web address] or by calling [phone].

## 2024-10-16 NOTE — DISCHARGE NOTE ANTEPARTUM - ADMISSION DATE +STARTOFVISITDATE
FAMILY HISTORY:  Family history of diabetes mellitus  Family history of early CAD  Family history of early CAD  FH: type 2 diabetes mellitus    Sibling  Still living? No  Family history of pancreatic cancer, Age at diagnosis: 61-70    
Statement Selected

## 2024-10-16 NOTE — PROGRESS NOTE ADULT - ATTENDING COMMENTS
MFM ATTENDING    37yo P2 at 30w2d, di di twins, GHTN, FGR twin B admitted for IAEDV of twin B with decreased fetal movements.     Since admission, feels well, reports normal fetal movements x2, denies vb/lof/ctx. dopplers have improved and there is now presence of diastolic flow throughout.   Denies headache / blurry vision / scotomata / ruq / epig pain.     s/p acs 10/14-15  s/p NICU consult     Re: GHTN, labetalol was discontinued, BPs remain normal range, labs wnl on presentation, 24hr urine protein = 96.     Can dc home today  has rx for bp cuff  Fetal testing Friday 10/18 at 1111 and then HRC on Monday 10/21.  Precautions reviewed     All questions answered to patients satisfaction.   Declined . MFM ATTENDING    39yo P2 at 30w2d, di di twins, GHTN, FGR twin B admitted for IAEDV of twin B with decreased fetal movements.     Since admission, feels well, reports normal fetal movements x2, denies vb/lof/ctx. dopplers have improved and there is now presence of diastolic flow throughout.   Denies headache / blurry vision / scotomata / ruq / epig pain.     FHT A: 135/mod/+ a / no decels  FHT B: 135 / mod / +a / no decels    s/p acs 10/14-15  s/p NICU consult     Re: GHTN, labetalol was discontinued, BPs remain normal range, labs wnl on presentation, 24hr urine protein = 96.     Can dc home today  has rx for bp cuff  Fetal testing Friday 10/18 at 1111 and then HRC on Monday 10/21.  Precautions reviewed     All questions answered to patients satisfaction.   Declined .

## 2024-10-16 NOTE — PROGRESS NOTE ADULT - SUBJECTIVE AND OBJECTIVE BOX
ID: #[id number] (Robbie)    R3 Antepartum Note,     Patient seen and examined at bedside, no acute overnight events. No acute complaints. Pt reports +FM, denies LOF, VB, ctx, HA, epigastric pain, blurred vision, CP, SOB, N/V, fevers, and chills.    Vital Signs Last 24 Hours  T(C): 36.3 (10-15-24 @ 05:25), Max: 36.7 (10-14-24 @ 13:49)  HR: 75 (10-15-24 @ 08:01) (65 - 90)  BP: 114/62 (10-15-24 @ 05:25) (111/67 - 136/63)  RR: 17 (10-15-24 @ 05:25) (16 - 18)  SpO2: 96% (10-15-24 @ 08:01) (93% - 98%)      Physical Exam:  General: NAD  Abdomen: Soft, non-tender, gravid  Ext: No pain or swelling      Labs:             10.8   8.85  )-----------( 265      ( 10-14 @ 15:23 )             32.9     10-14 @ 15:23    135  |  102  |  7   ----------------------------<  97  3.8   |  21  |  0.51    Ca    8.9      10-14 @ 15:23    TPro  6.8  /  Alb  3.6  /  TBili  <0.2  /  DBili  x   /  AST  11  /  ALT  7   /  AlkPhos  119  10-14 @ 15:23        Uric Acid: (10-14 @ 15:23)  4.0      Fibrinogen: (10-14 @ 15:23)  --       LDH: (10-14 @ 15:23)  167        MEDICATIONS  (STANDING):  betamethasone Injectable 12 milliGRAM(s) IntraMuscular once  ferrous    sulfate 325 milliGRAM(s) Oral daily  folic acid 1 milliGRAM(s) Oral daily  heparin   Injectable 5000 Unit(s) SubCutaneous every 12 hours  prenatal multivitamin 1 Tablet(s) Oral daily  valACYclovir 1000 milliGRAM(s) Oral every 12 hours    MEDICATIONS  (PRN):  
: #[id number] (Thao)    R3 Antepartum Note,     Patient seen and examined at bedside, no acute overnight events. No acute complaints. Pt reports +FM x2, denies LOF, VB, ctx, HA, epigastric pain, blurred vision, CP, SOB, N/V, fevers, and chills.      Vital Signs Last 24 Hours  T(C): 36.8 (10-16-24 @ 05:14), Max: 37.2 (10-15-24 @ 18:20)  HR: 71 (10-16-24 @ 05:14) (65 - 92)  BP: 108/59 (10-16-24 @ 05:14) (104/57 - 134/68)  RR: 16 (10-16-24 @ 05:14) (16 - 18)  SpO2: 98% (10-16-24 @ 05:14) (93% - 98%)      Physical Exam:  General: NAD  Abdomen: Soft, non-tender, gravid  Ext: No pain or swelling    Labs:             10.8   8.85  )-----------( 265      ( 10-14 @ 15:23 )             32.9     10-14 @ 15:23    135  |  102  |  7   ----------------------------<  97  3.8   |  21  |  0.51    Ca    8.9      10-14 @ 15:23    TPro  6.8  /  Alb  3.6  /  TBili  <0.2  /  DBili  x   /  AST  11  /  ALT  7   /  AlkPhos  119  10-14 @ 15:23      Uric Acid: (10-14 @ 15:23)  4.0      Fibrinogen: (10-14 @ 15:23)  --       LDH: (10-14 @ 15:23)  167        MEDICATIONS  (STANDING):  ferrous    sulfate 325 milliGRAM(s) Oral daily  folic acid 1 milliGRAM(s) Oral daily  heparin   Injectable 5000 Unit(s) SubCutaneous every 12 hours  prenatal multivitamin 1 Tablet(s) Oral daily  valACYclovir 1000 milliGRAM(s) Oral every 12 hours    MEDICATIONS  (PRN):

## 2024-10-16 NOTE — DISCHARGE NOTE ANTEPARTUM - MEDICATION SUMMARY - MEDICATIONS TO TAKE
I will START or STAY ON the medications listed below when I get home from the hospital:    Valtrex 1 g oral tablet  -- 1 tab(s) by mouth 2 times a day  -- Indication: For HSV    Prenatal Multivitamins with Folic Acid 1 mg oral tablet  -- 1 tab(s) by mouth once a day  -- Indication: For pregnancy    ferrous sulfate 325 mg (65 mg elemental iron) oral tablet  -- 1 tab(s) by mouth once a day  -- Indication: For pregnancy    folic acid 1 mg oral tablet  -- 1 tab(s) by mouth once a day  -- Indication: For pregnancy

## 2024-10-16 NOTE — PROGRESS NOTE ADULT - ASSESSMENT
38 yof  @ 30w2d GA with di-di twins, preg c/b cHTN previously on Lab 100 BID, and evidence of IUGR in twin B, presenting today with doppler changes and subjective c/o decreased fetal movement. UAD today shows intermittent AEDV in twin B. Admitted for prolonged monitoring of fetuses, BP monitoring, and for BMZ. Currently asymptomatic and without concerns for PTL. Sono yesterday without evidence of UAD abnormalities in both Twin A and B. Labetalol currently being held as reasons for starting unclear and to not mask any potential severe BPs as diagosis of PEC/sPEC will . Patient without elevated BPs overnight even off of anti-hypertensives. Patient states she is scheduled to follow with clinic appt tomorrow (10/17).      #Fetal wellbeing  - BMZ (10/14-)  - NST BID  - NICU consulted  - f/u GBS (10/14)      #cHTN vs gHTN  - AM HELLP labs  - Monitor BPs  - 24 hour urine protein: 96  - hold Lab 100 BID and monitor BPs      #Maternal wellbeing  - Regular diet  - HSQ/SCDs for DVT ppx  - PNV/Iron/Colace/Folic acid  - f/u UCx      Jinsol Crystal, PGY3

## 2024-10-16 NOTE — CHART NOTE - NSCHARTNOTEFT_GEN_A_CORE
Called [phone] regarding Medicaid ride for OB clinic appointment at [facility] on 10/18/24 at 1pm. Pt scheduled for ride with [facility] [employer] taxi service. Invoice # [id number]. Patient aware of appt and give  time of 12pm.     Taisha Negro NP

## 2024-10-16 NOTE — PROGRESS NOTE ADULT - TIME BILLING
All of the following as applicable:   Reviewing chart including relevant [facility] and/or outside records/notes/labs/imaging/other pertinent results, interviewing the patient, examining the patient, fetal evaluation including ultrasound and fetal heart rate tracing interpretation, decision making, counseling the patient, consent discussion with the patient, answering questions for the patient and/or her support persons, discussion and planning with consulting services, discussing recommendations/plan/coordination with primary/covering OB, coordination of care and follow-up, and documentation.     Time spent excludes teaching time and separately billable procedures.
This is a high complexity pt with maternal/fetal findings that increase complexity of medical decision making. The time was spent counseling, educating, reviewing previous notes and tests, coordinating care and documenting. I agreed with the history, physical exam and plan as documented by NP/resident/fellow.

## 2024-10-17 ENCOUNTER — NON-APPOINTMENT (OUTPATIENT)
Age: 39
End: 2024-10-17

## 2024-10-17 ENCOUNTER — APPOINTMENT (OUTPATIENT)
Dept: ANTEPARTUM | Facility: CLINIC | Age: 39
End: 2024-10-17

## 2024-10-18 ENCOUNTER — APPOINTMENT (OUTPATIENT)
Dept: OTHER | Facility: CLINIC | Age: 39
End: 2024-10-18

## 2024-10-18 ENCOUNTER — NON-APPOINTMENT (OUTPATIENT)
Age: 39
End: 2024-10-18

## 2024-10-18 ENCOUNTER — APPOINTMENT (OUTPATIENT)
Dept: MATERNAL FETAL MEDICINE | Facility: HOSPITAL | Age: 39
End: 2024-10-18
Payer: MEDICAID

## 2024-10-18 ENCOUNTER — APPOINTMENT (OUTPATIENT)
Dept: ANTEPARTUM | Facility: CLINIC | Age: 39
End: 2024-10-18

## 2024-10-18 ENCOUNTER — ASOB RESULT (OUTPATIENT)
Age: 39
End: 2024-10-18

## 2024-10-18 PROCEDURE — 76820 UMBILICAL ARTERY ECHO: CPT | Mod: 26

## 2024-10-18 PROCEDURE — 76818 FETAL BIOPHYS PROFILE W/NST: CPT | Mod: 26

## 2024-10-18 PROCEDURE — 76818 FETAL BIOPHYS PROFILE W/NST: CPT | Mod: 26,59

## 2024-10-21 ENCOUNTER — APPOINTMENT (OUTPATIENT)
Dept: MATERNAL FETAL MEDICINE | Facility: HOSPITAL | Age: 39
End: 2024-10-21

## 2024-10-21 ENCOUNTER — APPOINTMENT (OUTPATIENT)
Dept: ANTEPARTUM | Facility: HOSPITAL | Age: 39
End: 2024-10-21

## 2024-10-21 ENCOUNTER — ASOB RESULT (OUTPATIENT)
Age: 39
End: 2024-10-21

## 2024-10-21 ENCOUNTER — OUTPATIENT (OUTPATIENT)
Dept: OUTPATIENT SERVICES | Facility: HOSPITAL | Age: 39
LOS: 1 days | End: 2024-10-21

## 2024-10-21 ENCOUNTER — NON-APPOINTMENT (OUTPATIENT)
Age: 39
End: 2024-10-21

## 2024-10-21 VITALS
HEIGHT: 67 IN | HEART RATE: 78 BPM | DIASTOLIC BLOOD PRESSURE: 78 MMHG | BODY MASS INDEX: 38.22 KG/M2 | WEIGHT: 243.5 LBS | SYSTOLIC BLOOD PRESSURE: 110 MMHG | TEMPERATURE: 97.8 F

## 2024-10-21 DIAGNOSIS — Z23 ENCOUNTER FOR IMMUNIZATION: ICD-10-CM

## 2024-10-21 DIAGNOSIS — Z00.00 ENCOUNTER FOR GENERAL ADULT MEDICAL EXAMINATION WITHOUT ABNORMAL FINDINGS: ICD-10-CM

## 2024-10-21 PROCEDURE — 99213 OFFICE O/P EST LOW 20 MIN: CPT | Mod: TH,GC,25

## 2024-10-21 PROCEDURE — 76819 FETAL BIOPHYS PROFIL W/O NST: CPT | Mod: 26,59

## 2024-10-22 DIAGNOSIS — O30.049 TWIN PREGNANCY, DICHORIONIC/DIAMNIOTIC, UNSPECIFIED TRIMESTER: ICD-10-CM

## 2024-10-22 DIAGNOSIS — O36.5990 MATERNAL CARE FOR OTHER KNOWN OR SUSPECTED POOR FETAL GROWTH, UNSPECIFIED TRIMESTER, NOT APPLICABLE OR UNSPECIFIED: ICD-10-CM

## 2024-10-22 DIAGNOSIS — O09.93 SUPERVISION OF HIGH RISK PREGNANCY, UNSPECIFIED, THIRD TRIMESTER: ICD-10-CM

## 2024-10-23 ENCOUNTER — NON-APPOINTMENT (OUTPATIENT)
Age: 39
End: 2024-10-23

## 2024-10-24 ENCOUNTER — APPOINTMENT (OUTPATIENT)
Dept: ANTEPARTUM | Facility: CLINIC | Age: 39
End: 2024-10-24
Payer: MEDICAID

## 2024-10-24 ENCOUNTER — ASOB RESULT (OUTPATIENT)
Age: 39
End: 2024-10-24

## 2024-10-24 DIAGNOSIS — D50.9 IRON DEFICIENCY ANEMIA, UNSPECIFIED: ICD-10-CM

## 2024-10-24 DIAGNOSIS — O30.049 TWIN PREGNANCY, DICHORIONIC/DIAMNIOTIC, UNSPECIFIED TRIMESTER: ICD-10-CM

## 2024-10-24 DIAGNOSIS — Z78.9 OTHER SPECIFIED HEALTH STATUS: ICD-10-CM

## 2024-10-24 DIAGNOSIS — O09.93 SUPERVISION OF HIGH RISK PREGNANCY, UNSPECIFIED, THIRD TRIMESTER: ICD-10-CM

## 2024-10-24 DIAGNOSIS — O36.5990 MATERNAL CARE FOR OTHER KNOWN OR SUSPECTED POOR FETAL GROWTH, UNSPECIFIED TRIMESTER, NOT APPLICABLE OR UNSPECIFIED: ICD-10-CM

## 2024-10-24 PROCEDURE — 76820 UMBILICAL ARTERY ECHO: CPT

## 2024-10-24 PROCEDURE — 76818 FETAL BIOPHYS PROFILE W/NST: CPT | Mod: 59

## 2024-10-24 PROCEDURE — 76818 FETAL BIOPHYS PROFILE W/NST: CPT

## 2024-10-25 ENCOUNTER — APPOINTMENT (OUTPATIENT)
Dept: ANTEPARTUM | Facility: CLINIC | Age: 39
End: 2024-10-25

## 2024-10-28 ENCOUNTER — APPOINTMENT (OUTPATIENT)
Dept: ANTEPARTUM | Facility: HOSPITAL | Age: 39
End: 2024-10-28
Payer: MEDICAID

## 2024-10-28 ENCOUNTER — APPOINTMENT (OUTPATIENT)
Dept: MATERNAL FETAL MEDICINE | Facility: HOSPITAL | Age: 39
End: 2024-10-28
Payer: MEDICAID

## 2024-10-28 ENCOUNTER — MED ADMIN CHARGE (OUTPATIENT)
Age: 39
End: 2024-10-28

## 2024-10-28 ENCOUNTER — OUTPATIENT (OUTPATIENT)
Dept: OUTPATIENT SERVICES | Facility: HOSPITAL | Age: 39
LOS: 1 days | End: 2024-10-28

## 2024-10-28 ENCOUNTER — NON-APPOINTMENT (OUTPATIENT)
Age: 39
End: 2024-10-28

## 2024-10-28 ENCOUNTER — ASOB RESULT (OUTPATIENT)
Age: 39
End: 2024-10-28

## 2024-10-28 VITALS
DIASTOLIC BLOOD PRESSURE: 76 MMHG | SYSTOLIC BLOOD PRESSURE: 118 MMHG | TEMPERATURE: 97.7 F | WEIGHT: 244 LBS | HEIGHT: 67 IN | HEART RATE: 71 BPM | BODY MASS INDEX: 38.3 KG/M2

## 2024-10-28 DIAGNOSIS — R79.89 OTHER SPECIFIED ABNORMAL FINDINGS OF BLOOD CHEMISTRY: ICD-10-CM

## 2024-10-28 DIAGNOSIS — Z29.11 ENCOUNTER FOR PROPHYLACTIC IMMUNOTHERAPY FOR RESPIRATORY SYNCYTIAL VIRUS (RSV): ICD-10-CM

## 2024-10-28 DIAGNOSIS — Z67.91 OTHER SPECIFIED PREGNANCY RELATED CONDITIONS, UNSPECIFIED TRIMESTER: ICD-10-CM

## 2024-10-28 DIAGNOSIS — O09.93 SUPERVISION OF HIGH RISK PREGNANCY, UNSPECIFIED, THIRD TRIMESTER: ICD-10-CM

## 2024-10-28 DIAGNOSIS — Z01.419 ENCOUNTER FOR GYNECOLOGICAL EXAMINATION (GENERAL) (ROUTINE) W/OUT ABNORMAL FINDINGS: ICD-10-CM

## 2024-10-28 DIAGNOSIS — O26.899 OTHER SPECIFIED PREGNANCY RELATED CONDITIONS, UNSPECIFIED TRIMESTER: ICD-10-CM

## 2024-10-28 DIAGNOSIS — Z00.00 ENCOUNTER FOR GENERAL ADULT MEDICAL EXAMINATION WITHOUT ABNORMAL FINDINGS: ICD-10-CM

## 2024-10-28 DIAGNOSIS — D50.9 IRON DEFICIENCY ANEMIA, UNSPECIFIED: ICD-10-CM

## 2024-10-28 DIAGNOSIS — O30.049 TWIN PREGNANCY, DICHORIONIC/DIAMNIOTIC, UNSPECIFIED TRIMESTER: ICD-10-CM

## 2024-10-28 DIAGNOSIS — B00.9 HERPESVIRAL INFECTION, UNSPECIFIED: ICD-10-CM

## 2024-10-28 DIAGNOSIS — O36.5990 MATERNAL CARE FOR OTHER KNOWN OR SUSPECTED POOR FETAL GROWTH, UNSPECIFIED TRIMESTER, NOT APPLICABLE OR UNSPECIFIED: ICD-10-CM

## 2024-10-28 PROCEDURE — 76818 FETAL BIOPHYS PROFILE W/NST: CPT | Mod: 26,59

## 2024-10-28 PROCEDURE — 76818 FETAL BIOPHYS PROFILE W/NST: CPT | Mod: 26

## 2024-10-28 PROCEDURE — 76816 OB US FOLLOW-UP PER FETUS: CPT | Mod: 26,59

## 2024-10-28 PROCEDURE — 76816 OB US FOLLOW-UP PER FETUS: CPT | Mod: 26

## 2024-10-28 RX ORDER — VALACYCLOVIR 500 MG/1
500 TABLET, FILM COATED ORAL TWICE DAILY
Qty: 14 | Refills: 0 | Status: ACTIVE | COMMUNITY
Start: 2024-10-28 | End: 1900-01-01

## 2024-10-29 ENCOUNTER — NON-APPOINTMENT (OUTPATIENT)
Age: 39
End: 2024-10-29

## 2024-10-30 ENCOUNTER — APPOINTMENT (OUTPATIENT)
Dept: ANTEPARTUM | Facility: CLINIC | Age: 39
End: 2024-10-30

## 2024-10-31 ENCOUNTER — APPOINTMENT (OUTPATIENT)
Dept: MATERNAL FETAL MEDICINE | Facility: HOSPITAL | Age: 39
End: 2024-10-31
Payer: MEDICAID

## 2024-10-31 ENCOUNTER — APPOINTMENT (OUTPATIENT)
Dept: ANTEPARTUM | Facility: CLINIC | Age: 39
End: 2024-10-31

## 2024-10-31 ENCOUNTER — ASOB RESULT (OUTPATIENT)
Age: 39
End: 2024-10-31

## 2024-10-31 PROCEDURE — 76819 FETAL BIOPHYS PROFIL W/O NST: CPT | Mod: 26

## 2024-10-31 PROCEDURE — 76820 UMBILICAL ARTERY ECHO: CPT | Mod: 26,59

## 2024-11-01 ENCOUNTER — NON-APPOINTMENT (OUTPATIENT)
Age: 39
End: 2024-11-01

## 2024-11-04 ENCOUNTER — APPOINTMENT (OUTPATIENT)
Dept: ANTEPARTUM | Facility: HOSPITAL | Age: 39
End: 2024-11-04
Payer: MEDICAID

## 2024-11-04 ENCOUNTER — OUTPATIENT (OUTPATIENT)
Dept: OUTPATIENT SERVICES | Facility: HOSPITAL | Age: 39
LOS: 1 days | End: 2024-11-04

## 2024-11-04 VITALS
DIASTOLIC BLOOD PRESSURE: 83 MMHG | SYSTOLIC BLOOD PRESSURE: 129 MMHG | HEART RATE: 74 BPM | WEIGHT: 247 LBS | HEIGHT: 67 IN | TEMPERATURE: 98 F | BODY MASS INDEX: 38.77 KG/M2

## 2024-11-04 DIAGNOSIS — Z98.890 OTHER SPECIFIED POSTPROCEDURAL STATES: Chronic | ICD-10-CM

## 2024-11-04 PROCEDURE — 99213 OFFICE O/P EST LOW 20 MIN: CPT | Mod: TH,GC,25

## 2024-11-05 DIAGNOSIS — O30.049 TWIN PREGNANCY, DICHORIONIC/DIAMNIOTIC, UNSPECIFIED TRIMESTER: ICD-10-CM

## 2024-11-05 DIAGNOSIS — O36.5990 MATERNAL CARE FOR OTHER KNOWN OR SUSPECTED POOR FETAL GROWTH, UNSPECIFIED TRIMESTER, NOT APPLICABLE OR UNSPECIFIED: ICD-10-CM

## 2024-11-05 DIAGNOSIS — B00.9 HERPESVIRAL INFECTION, UNSPECIFIED: ICD-10-CM

## 2024-11-07 ENCOUNTER — ASOB RESULT (OUTPATIENT)
Age: 39
End: 2024-11-07

## 2024-11-07 ENCOUNTER — APPOINTMENT (OUTPATIENT)
Dept: MATERNAL FETAL MEDICINE | Facility: HOSPITAL | Age: 39
End: 2024-11-07
Payer: MEDICAID

## 2024-11-07 PROCEDURE — 76820 UMBILICAL ARTERY ECHO: CPT | Mod: 26

## 2024-11-07 PROCEDURE — 76818 FETAL BIOPHYS PROFILE W/NST: CPT | Mod: 26,59

## 2024-11-07 PROCEDURE — 76818 FETAL BIOPHYS PROFILE W/NST: CPT | Mod: 26

## 2024-11-11 ENCOUNTER — ASOB RESULT (OUTPATIENT)
Age: 39
End: 2024-11-11

## 2024-11-11 ENCOUNTER — NON-APPOINTMENT (OUTPATIENT)
Age: 39
End: 2024-11-11

## 2024-11-11 ENCOUNTER — APPOINTMENT (OUTPATIENT)
Dept: MATERNAL FETAL MEDICINE | Facility: HOSPITAL | Age: 39
End: 2024-11-11
Payer: MEDICAID

## 2024-11-11 ENCOUNTER — APPOINTMENT (OUTPATIENT)
Dept: ANTEPARTUM | Facility: HOSPITAL | Age: 39
End: 2024-11-11
Payer: MEDICAID

## 2024-11-11 ENCOUNTER — OUTPATIENT (OUTPATIENT)
Dept: OUTPATIENT SERVICES | Facility: HOSPITAL | Age: 39
LOS: 1 days | End: 2024-11-11

## 2024-11-11 VITALS
HEART RATE: 74 BPM | DIASTOLIC BLOOD PRESSURE: 76 MMHG | BODY MASS INDEX: 39.55 KG/M2 | SYSTOLIC BLOOD PRESSURE: 122 MMHG | WEIGHT: 252 LBS | HEIGHT: 67 IN | TEMPERATURE: 97.9 F

## 2024-11-11 DIAGNOSIS — B00.9 HERPESVIRAL INFECTION, UNSPECIFIED: ICD-10-CM

## 2024-11-11 DIAGNOSIS — R79.89 OTHER SPECIFIED ABNORMAL FINDINGS OF BLOOD CHEMISTRY: ICD-10-CM

## 2024-11-11 DIAGNOSIS — D50.9 IRON DEFICIENCY ANEMIA, UNSPECIFIED: ICD-10-CM

## 2024-11-11 PROCEDURE — 76820 UMBILICAL ARTERY ECHO: CPT | Mod: 26,59

## 2024-11-11 PROCEDURE — 76818 FETAL BIOPHYS PROFILE W/NST: CPT | Mod: 26

## 2024-11-11 PROCEDURE — 76820 UMBILICAL ARTERY ECHO: CPT | Mod: 26

## 2024-11-12 DIAGNOSIS — O30.049 TWIN PREGNANCY, DICHORIONIC/DIAMNIOTIC, UNSPECIFIED TRIMESTER: ICD-10-CM

## 2024-11-12 DIAGNOSIS — B00.9 HERPESVIRAL INFECTION, UNSPECIFIED: ICD-10-CM

## 2024-11-12 DIAGNOSIS — O36.5990 MATERNAL CARE FOR OTHER KNOWN OR SUSPECTED POOR FETAL GROWTH, UNSPECIFIED TRIMESTER, NOT APPLICABLE OR UNSPECIFIED: ICD-10-CM

## 2024-11-12 DIAGNOSIS — O28.0 ABNORMAL HEMATOLOGICAL FINDING ON ANTENATAL SCREENING OF MOTHER: ICD-10-CM

## 2024-11-12 DIAGNOSIS — D50.9 IRON DEFICIENCY ANEMIA, UNSPECIFIED: ICD-10-CM

## 2024-11-12 DIAGNOSIS — R79.89 OTHER SPECIFIED ABNORMAL FINDINGS OF BLOOD CHEMISTRY: ICD-10-CM

## 2024-11-12 DIAGNOSIS — O09.93 SUPERVISION OF HIGH RISK PREGNANCY, UNSPECIFIED, THIRD TRIMESTER: ICD-10-CM

## 2024-11-14 ENCOUNTER — APPOINTMENT (OUTPATIENT)
Dept: MATERNAL FETAL MEDICINE | Facility: HOSPITAL | Age: 39
End: 2024-11-14

## 2024-11-14 ENCOUNTER — NON-APPOINTMENT (OUTPATIENT)
Age: 39
End: 2024-11-14

## 2024-11-18 ENCOUNTER — RESULT REVIEW (OUTPATIENT)
Age: 39
End: 2024-11-18

## 2024-11-18 ENCOUNTER — APPOINTMENT (OUTPATIENT)
Dept: MATERNAL FETAL MEDICINE | Facility: HOSPITAL | Age: 39
End: 2024-11-18
Payer: MEDICAID

## 2024-11-18 ENCOUNTER — APPOINTMENT (OUTPATIENT)
Dept: ANTEPARTUM | Facility: HOSPITAL | Age: 39
End: 2024-11-18
Payer: MEDICAID

## 2024-11-18 ENCOUNTER — OUTPATIENT (OUTPATIENT)
Dept: OUTPATIENT SERVICES | Facility: HOSPITAL | Age: 39
LOS: 1 days | End: 2024-11-18

## 2024-11-18 ENCOUNTER — ASOB RESULT (OUTPATIENT)
Age: 39
End: 2024-11-18

## 2024-11-18 VITALS
BODY MASS INDEX: 40.02 KG/M2 | TEMPERATURE: 97.9 F | DIASTOLIC BLOOD PRESSURE: 82 MMHG | HEART RATE: 69 BPM | HEIGHT: 67 IN | SYSTOLIC BLOOD PRESSURE: 132 MMHG | WEIGHT: 255 LBS

## 2024-11-18 DIAGNOSIS — Z98.890 OTHER SPECIFIED POSTPROCEDURAL STATES: Chronic | ICD-10-CM

## 2024-11-18 DIAGNOSIS — O09.899 ABNORMAL HEMATOLOGICAL FINDING ON ANTENATAL SCREENING OF MOTHER: ICD-10-CM

## 2024-11-18 DIAGNOSIS — O28.0 ABNORMAL HEMATOLOGICAL FINDING ON ANTENATAL SCREENING OF MOTHER: ICD-10-CM

## 2024-11-18 LAB
BASOPHILS # BLD AUTO: 0.03 K/UL — SIGNIFICANT CHANGE UP (ref 0–0.2)
BASOPHILS NFR BLD AUTO: 0.3 % — SIGNIFICANT CHANGE UP (ref 0–2)
EOSINOPHIL # BLD AUTO: 0.04 K/UL — SIGNIFICANT CHANGE UP (ref 0–0.5)
EOSINOPHIL NFR BLD AUTO: 0.4 % — SIGNIFICANT CHANGE UP (ref 0–6)
HCT VFR BLD CALC: 34.2 % — LOW (ref 34.5–45)
HGB BLD-MCNC: 11.3 G/DL — LOW (ref 11.5–15.5)
HIV 1+2 AB+HIV1 P24 AG SERPL QL IA: SIGNIFICANT CHANGE UP
IANC: 6.24 K/UL — SIGNIFICANT CHANGE UP (ref 1.8–7.4)
IMM GRANULOCYTES NFR BLD AUTO: 0.7 % — SIGNIFICANT CHANGE UP (ref 0–0.9)
LYMPHOCYTES # BLD AUTO: 2.37 K/UL — SIGNIFICANT CHANGE UP (ref 1–3.3)
LYMPHOCYTES # BLD AUTO: 25.2 % — SIGNIFICANT CHANGE UP (ref 13–44)
MCHC RBC-ENTMCNC: 30.2 PG — SIGNIFICANT CHANGE UP (ref 27–34)
MCHC RBC-ENTMCNC: 33 G/DL — SIGNIFICANT CHANGE UP (ref 32–36)
MCV RBC AUTO: 91.4 FL — SIGNIFICANT CHANGE UP (ref 80–100)
MONOCYTES # BLD AUTO: 0.64 K/UL — SIGNIFICANT CHANGE UP (ref 0–0.9)
MONOCYTES NFR BLD AUTO: 6.8 % — SIGNIFICANT CHANGE UP (ref 2–14)
NEUTROPHILS # BLD AUTO: 6.24 K/UL — SIGNIFICANT CHANGE UP (ref 1.8–7.4)
NEUTROPHILS NFR BLD AUTO: 66.6 % — SIGNIFICANT CHANGE UP (ref 43–77)
NRBC # BLD: 0 /100 WBCS — SIGNIFICANT CHANGE UP (ref 0–0)
NRBC # FLD: 0 K/UL — SIGNIFICANT CHANGE UP (ref 0–0)
PLATELET # BLD AUTO: 225 K/UL — SIGNIFICANT CHANGE UP (ref 150–400)
RBC # BLD: 3.74 M/UL — LOW (ref 3.8–5.2)
RBC # FLD: 13.7 % — SIGNIFICANT CHANGE UP (ref 10.3–14.5)
WBC # BLD: 9.39 K/UL — SIGNIFICANT CHANGE UP (ref 3.8–10.5)
WBC # FLD AUTO: 9.39 K/UL — SIGNIFICANT CHANGE UP (ref 3.8–10.5)

## 2024-11-18 PROCEDURE — 76820 UMBILICAL ARTERY ECHO: CPT | Mod: 26,59

## 2024-11-18 PROCEDURE — 76818 FETAL BIOPHYS PROFILE W/NST: CPT | Mod: 26,59

## 2024-11-18 PROCEDURE — 76816 OB US FOLLOW-UP PER FETUS: CPT | Mod: 26

## 2024-11-18 PROCEDURE — 76816 OB US FOLLOW-UP PER FETUS: CPT | Mod: 26,59

## 2024-11-18 RX ORDER — VALACYCLOVIR 500 MG/1
500 TABLET, FILM COATED ORAL TWICE DAILY
Qty: 28 | Refills: 0 | Status: ACTIVE | COMMUNITY
Start: 2024-11-18 | End: 1900-01-01

## 2024-11-19 ENCOUNTER — NON-APPOINTMENT (OUTPATIENT)
Age: 39
End: 2024-11-19

## 2024-11-19 DIAGNOSIS — O28.0 ABNORMAL HEMATOLOGICAL FINDING ON ANTENATAL SCREENING OF MOTHER: ICD-10-CM

## 2024-11-19 DIAGNOSIS — O10.913 UNSPECIFIED PRE-EXISTING HYPERTENSION COMPLICATING PREGNANCY, THIRD TRIMESTER: ICD-10-CM

## 2024-11-19 DIAGNOSIS — O36.5990 MATERNAL CARE FOR OTHER KNOWN OR SUSPECTED POOR FETAL GROWTH, UNSPECIFIED TRIMESTER, NOT APPLICABLE OR UNSPECIFIED: ICD-10-CM

## 2024-11-19 DIAGNOSIS — O30.049 TWIN PREGNANCY, DICHORIONIC/DIAMNIOTIC, UNSPECIFIED TRIMESTER: ICD-10-CM

## 2024-11-19 DIAGNOSIS — O26.899 OTHER SPECIFIED PREGNANCY RELATED CONDITIONS, UNSPECIFIED TRIMESTER: ICD-10-CM

## 2024-11-19 LAB
C TRACH RRNA SPEC QL NAA+PROBE: SIGNIFICANT CHANGE UP
N GONORRHOEA RRNA SPEC QL NAA+PROBE: SIGNIFICANT CHANGE UP
SPECIMEN SOURCE: SIGNIFICANT CHANGE UP
T PALLIDUM AB TITR SER: NEGATIVE — SIGNIFICANT CHANGE UP

## 2024-11-20 ENCOUNTER — NON-APPOINTMENT (OUTPATIENT)
Age: 39
End: 2024-11-20

## 2024-11-20 ENCOUNTER — OUTPATIENT (OUTPATIENT)
Dept: OUTPATIENT SERVICES | Facility: HOSPITAL | Age: 39
LOS: 1 days | End: 2024-11-20

## 2024-11-20 VITALS
RESPIRATION RATE: 18 BRPM | HEART RATE: 73 BPM | DIASTOLIC BLOOD PRESSURE: 82 MMHG | OXYGEN SATURATION: 97 % | SYSTOLIC BLOOD PRESSURE: 129 MMHG | HEIGHT: 65 IN | WEIGHT: 257.94 LBS | TEMPERATURE: 98 F

## 2024-11-20 DIAGNOSIS — O30.049 TWIN PREGNANCY, DICHORIONIC/DIAMNIOTIC, UNSPECIFIED TRIMESTER: ICD-10-CM

## 2024-11-20 DIAGNOSIS — Z98.890 OTHER SPECIFIED POSTPROCEDURAL STATES: Chronic | ICD-10-CM

## 2024-11-20 DIAGNOSIS — B00.9 HERPESVIRAL INFECTION, UNSPECIFIED: ICD-10-CM

## 2024-11-20 LAB
APPEARANCE UR: ABNORMAL
BACTERIA # UR AUTO: ABNORMAL /HPF
BASOPHILS # BLD AUTO: 0.02 K/UL — SIGNIFICANT CHANGE UP (ref 0–0.2)
BASOPHILS NFR BLD AUTO: 0.2 % — SIGNIFICANT CHANGE UP (ref 0–2)
BILIRUB UR-MCNC: NEGATIVE — SIGNIFICANT CHANGE UP
BLD GP AB SCN SERPL QL: NEGATIVE — SIGNIFICANT CHANGE UP
CAST: 1 /LPF — SIGNIFICANT CHANGE UP (ref 0–4)
COLOR SPEC: YELLOW — SIGNIFICANT CHANGE UP
DIFF PNL FLD: NEGATIVE — SIGNIFICANT CHANGE UP
EOSINOPHIL # BLD AUTO: 0.06 K/UL — SIGNIFICANT CHANGE UP (ref 0–0.5)
EOSINOPHIL NFR BLD AUTO: 0.7 % — SIGNIFICANT CHANGE UP (ref 0–6)
GLUCOSE UR QL: NEGATIVE MG/DL — SIGNIFICANT CHANGE UP
HCT VFR BLD CALC: 33.3 % — LOW (ref 34.5–45)
HGB BLD-MCNC: 10.8 G/DL — LOW (ref 11.5–15.5)
IMM GRANULOCYTES NFR BLD AUTO: 0.9 % — SIGNIFICANT CHANGE UP (ref 0–0.9)
KETONES UR-MCNC: NEGATIVE MG/DL — SIGNIFICANT CHANGE UP
LEUKOCYTE ESTERASE UR-ACNC: ABNORMAL
LYMPHOCYTES # BLD AUTO: 2.19 K/UL — SIGNIFICANT CHANGE UP (ref 1–3.3)
LYMPHOCYTES # BLD AUTO: 24.1 % — SIGNIFICANT CHANGE UP (ref 13–44)
MCHC RBC-ENTMCNC: 30.1 PG — SIGNIFICANT CHANGE UP (ref 27–34)
MCHC RBC-ENTMCNC: 32.4 G/DL — SIGNIFICANT CHANGE UP (ref 32–36)
MCV RBC AUTO: 92.8 FL — SIGNIFICANT CHANGE UP (ref 80–100)
MONOCYTES # BLD AUTO: 0.54 K/UL — SIGNIFICANT CHANGE UP (ref 0–0.9)
MONOCYTES NFR BLD AUTO: 5.9 % — SIGNIFICANT CHANGE UP (ref 2–14)
NEUTROPHILS # BLD AUTO: 6.2 K/UL — SIGNIFICANT CHANGE UP (ref 1.8–7.4)
NEUTROPHILS NFR BLD AUTO: 68.2 % — SIGNIFICANT CHANGE UP (ref 43–77)
NITRITE UR-MCNC: NEGATIVE — SIGNIFICANT CHANGE UP
PH UR: 6.5 — SIGNIFICANT CHANGE UP (ref 5–8)
PLATELET # BLD AUTO: 204 K/UL — SIGNIFICANT CHANGE UP (ref 150–400)
PROT UR-MCNC: SIGNIFICANT CHANGE UP MG/DL
RBC # BLD: 3.59 M/UL — LOW (ref 3.8–5.2)
RBC # FLD: 14.2 % — SIGNIFICANT CHANGE UP (ref 10.3–14.5)
RBC CASTS # UR COMP ASSIST: 0 /HPF — SIGNIFICANT CHANGE UP (ref 0–4)
RETICS #: 80.6 K/UL — SIGNIFICANT CHANGE UP (ref 25–125)
RETICS/RBC NFR: 2.2 % — SIGNIFICANT CHANGE UP (ref 0.5–2.5)
REVIEW: SIGNIFICANT CHANGE UP
RH IG SCN BLD-IMP: NEGATIVE — SIGNIFICANT CHANGE UP
SP GR SPEC: 1.02 — SIGNIFICANT CHANGE UP (ref 1–1.03)
SQUAMOUS # UR AUTO: 32 /HPF — HIGH (ref 0–5)
UROBILINOGEN FLD QL: 0.2 MG/DL — SIGNIFICANT CHANGE UP (ref 0.2–1)
WBC # BLD: 9.09 K/UL — SIGNIFICANT CHANGE UP (ref 3.8–10.5)
WBC # FLD AUTO: 9.09 K/UL — SIGNIFICANT CHANGE UP (ref 3.8–10.5)
WBC UR QL: 17 /HPF — HIGH (ref 0–5)

## 2024-11-20 RX ORDER — 0.9 % SODIUM CHLORIDE 0.9 %
1000 INTRAVENOUS SOLUTION INTRAVENOUS
Refills: 0 | Status: DISCONTINUED | OUTPATIENT
Start: 2024-11-27 | End: 2024-11-29

## 2024-11-20 NOTE — OB PST NOTE - PROBLEM SELECTOR PLAN 1
Schedule for primary  section with bilateral tubal ligation - Twin gestation tentatively on 24 as per OB GYN Dr. Gunn. Pre op instructions. chlorhexidine gluconate soap given and explained. Pt verbalized understanding.

## 2024-11-20 NOTE — OB PST NOTE - NSMATERNALFETALCONCERNS_OBGYN_ALL_OB_FT
Maternal/Fetal Alert  AMA , carrier CF , no testing on partner   di di twins ; TWIN B 1%   tx of CARE from [facility] OB [facility] TO [facility] SECONDARY TO IUGR of twin B with elevated dopplers   intermittent care ( travel from [facility] )   ALERT NICU   Lisandra Ayala RN 9/30/2024    Maternal/Fetal Alert  AMA , DI DI twins

## 2024-11-20 NOTE — OB PST NOTE - HISTORY OF PRESENT ILLNESS
39 y/o F  37 y/o F 35.2 weeks pregnant, , LMP 24, VIBHA 24 presents for pre op evaluation for primary  section with bilateral tubal ligation - Twin gestation tentatively on 24 as per OB GYN Dr. Gunn. Pt denies abnormal vaginal bleeding or leaking. Reports + fetal movements during PST

## 2024-11-20 NOTE — OB PST NOTE - NSANTHOSAYNRD_GEN_A_CORE
No. DENY screening performed.  STOP BANG Legend: 0-2 = LOW Risk; 3-4 = INTERMEDIATE Risk; 5-8 = HIGH Risk

## 2024-11-20 NOTE — OB PST NOTE - NSHPPHYSICALEXAM_GEN_ALL_CORE
Constitutional: Well Developed, Well Groomed, Well Nourished, No Distress    Eyes: PERRL, EOMI, conjunctiva clear    Ears: Normal    Mouth & Gums: Normal, moist    Pharynx: No tenderness, discharge, or peritonsillar abscess    Tonsils: No Redness, discharge, tenderness, or swelling    Neck: Supple, no JVD, normal thyroid glands, no carotid bruits, no cervical vertebral or paraspinal tenderness    Breast: Normal shape, no masses, no tenderness, nipples normal, no nipple discharge    Back: Normal shape, ROM intact, strength intact, no vertebral tenderness    Respiratory: Airway patent, breath sounds equal, good air movement, respiration non-labored, clear to auscultation bilateral, no chest wall tenderness, no intercostal retractions, no rales, no wheezes, no rhonchi, no subcutaneous emphysema    Cardiovascular:  Regular rate and rhythm, no rubs or murmur, normal PMI    Gastrointestinal: Soft, non-tender, non distention, no masses palpable, bowel sound normal, no bruit, no rebound tenderness    Extremities: No clubbing, cyanosis, or pedal edema    Vascular:  Carotid Pulse normal , Radial Pulse normal, Femoral Pulse normal, DP pulse normal, PT pulse normal    Neurological: alert & oriented x 3, sensation intact, deep reflexes intact, cranial nerve intact, normal strength    Skin: warm and dry, normal color    Lymph Nodes: normal posterior cervical lymph node, normal anterior cervical lymph node, normal supraclavicular lymph node, normal axillary lymph node, normal inguinal lymph node, normal femoral lymph node    Musculoskeletal: BLE mild edema  ROM intact, warmth, or calf tenderness. Normal strength    Psychiatric: normal affect, normal behavior Constitutional: Well Developed, No Distress    Eyes: PERRL, EOMI, conjunctiva clear    Ears: Normal    Mouth & Gums: Normal, moist    Neck: Supple, no JVD, normal thyroid glands    Breast: Normal shape    Back: Normal shape    Respiratory: Airway patent, breath sounds equal, good air movement, respiration non-labored, clear to auscultation bilateral, no chest wall tenderness, no intercostal retractions, no rales, no wheezes, no rhonchi    Cardiovascular:  Regular rate and rhythm, no rubs or murmur, normal PMI    Gastrointestinal: Soft, non-tender, bowel sound normal    Extremities: No clubbing, cyanosis, or pedal edema    Vascular:  DP pulse normal    Neurological: alert & oriented x 3, sensation intact, deep reflexes intact, cranial nerve intact, normal strength    Skin: warm and dry, normal color    Lymph Nodes: normal posterior cervical lymph node, normal anterior cervical lymph node, normal supraclavicular lymph node    Musculoskeletal: + non pitting BLE edema  ROM intact, warmth, or calf tenderness. Normal strength    Psychiatric: normal affect, normal behavior

## 2024-11-21 ENCOUNTER — APPOINTMENT (OUTPATIENT)
Dept: OBGYN | Facility: HOSPITAL | Age: 39
End: 2024-11-21

## 2024-11-21 ENCOUNTER — OUTPATIENT (OUTPATIENT)
Dept: OUTPATIENT SERVICES | Facility: HOSPITAL | Age: 39
LOS: 1 days | End: 2024-11-21

## 2024-11-21 ENCOUNTER — ASOB RESULT (OUTPATIENT)
Age: 39
End: 2024-11-21

## 2024-11-21 ENCOUNTER — RESULT REVIEW (OUTPATIENT)
Age: 39
End: 2024-11-21

## 2024-11-21 ENCOUNTER — APPOINTMENT (OUTPATIENT)
Dept: MATERNAL FETAL MEDICINE | Facility: HOSPITAL | Age: 39
End: 2024-11-21
Payer: MEDICAID

## 2024-11-21 VITALS
HEART RATE: 72 BPM | HEIGHT: 67 IN | DIASTOLIC BLOOD PRESSURE: 86 MMHG | WEIGHT: 255 LBS | BODY MASS INDEX: 40.02 KG/M2 | SYSTOLIC BLOOD PRESSURE: 132 MMHG

## 2024-11-21 DIAGNOSIS — O09.93 SUPERVISION OF HIGH RISK PREGNANCY, UNSPECIFIED, THIRD TRIMESTER: ICD-10-CM

## 2024-11-21 DIAGNOSIS — O10.913 UNSPECIFIED PRE-EXISTING HYPERTENSION COMPLICATING PREGNANCY, THIRD TRIMESTER: ICD-10-CM

## 2024-11-21 LAB
ALBUMIN SERPL ELPH-MCNC: 3.3 G/DL — SIGNIFICANT CHANGE UP (ref 3.3–5)
ALBUMIN SERPL ELPH-MCNC: 3.4 G/DL — SIGNIFICANT CHANGE UP (ref 3.3–5)
ALP SERPL-CCNC: 152 U/L — HIGH (ref 40–120)
ALP SERPL-CCNC: 162 U/L — HIGH (ref 40–120)
ALT FLD-CCNC: 7 U/L — SIGNIFICANT CHANGE UP (ref 4–33)
ALT FLD-CCNC: 8 U/L — LOW (ref 10–45)
ANION GAP SERPL CALC-SCNC: 12 MMOL/L — SIGNIFICANT CHANGE UP (ref 7–14)
ANION GAP SERPL CALC-SCNC: 16 MMOL/L — SIGNIFICANT CHANGE UP (ref 5–17)
AST SERPL-CCNC: 14 U/L — SIGNIFICANT CHANGE UP (ref 10–40)
AST SERPL-CCNC: 14 U/L — SIGNIFICANT CHANGE UP (ref 4–32)
BASOPHILS # BLD AUTO: 0.02 K/UL — SIGNIFICANT CHANGE UP (ref 0–0.2)
BASOPHILS NFR BLD AUTO: 0.2 % — SIGNIFICANT CHANGE UP (ref 0–2)
BILIRUB SERPL-MCNC: <0.2 MG/DL — SIGNIFICANT CHANGE UP (ref 0.2–1.2)
BILIRUB SERPL-MCNC: <0.2 MG/DL — SIGNIFICANT CHANGE UP (ref 0.2–1.2)
BUN SERPL-MCNC: 11 MG/DL — SIGNIFICANT CHANGE UP (ref 7–23)
BUN SERPL-MCNC: 9 MG/DL — SIGNIFICANT CHANGE UP (ref 7–23)
CALCIUM SERPL-MCNC: 9 MG/DL — SIGNIFICANT CHANGE UP (ref 8.4–10.5)
CALCIUM SERPL-MCNC: 9.1 MG/DL — SIGNIFICANT CHANGE UP (ref 8.4–10.5)
CHLORIDE SERPL-SCNC: 102 MMOL/L — SIGNIFICANT CHANGE UP (ref 96–108)
CHLORIDE SERPL-SCNC: 104 MMOL/L — SIGNIFICANT CHANGE UP (ref 98–107)
CO2 SERPL-SCNC: 19 MMOL/L — LOW (ref 22–31)
CO2 SERPL-SCNC: 19 MMOL/L — LOW (ref 22–31)
CREAT SERPL-MCNC: 0.52 MG/DL — SIGNIFICANT CHANGE UP (ref 0.5–1.3)
CREAT SERPL-MCNC: 0.56 MG/DL — SIGNIFICANT CHANGE UP (ref 0.5–1.3)
CRP SERPL-MCNC: 23 MG/L — HIGH
EGFR: 120 ML/MIN/1.73M2 — SIGNIFICANT CHANGE UP
EGFR: 122 ML/MIN/1.73M2 — SIGNIFICANT CHANGE UP
EOSINOPHIL # BLD AUTO: 0.06 K/UL — SIGNIFICANT CHANGE UP (ref 0–0.5)
EOSINOPHIL NFR BLD AUTO: 0.7 % — SIGNIFICANT CHANGE UP (ref 0–6)
FERRITIN SERPL-MCNC: 32 NG/ML — SIGNIFICANT CHANGE UP (ref 15–150)
FOLATE SERPL-MCNC: 6.5 NG/ML — SIGNIFICANT CHANGE UP
GLUCOSE SERPL-MCNC: 76 MG/DL — SIGNIFICANT CHANGE UP (ref 70–99)
GLUCOSE SERPL-MCNC: 96 MG/DL — SIGNIFICANT CHANGE UP (ref 70–99)
HCT VFR BLD CALC: 34.5 % — SIGNIFICANT CHANGE UP (ref 34.5–45)
HGB BLD-MCNC: 11.3 G/DL — LOW (ref 11.5–15.5)
IANC: 6.19 K/UL — SIGNIFICANT CHANGE UP (ref 1.8–7.4)
IMM GRANULOCYTES NFR BLD AUTO: 0.8 % — SIGNIFICANT CHANGE UP (ref 0–0.9)
INR BLD: <0.9 RATIO — SIGNIFICANT CHANGE UP (ref 0.85–1.16)
IRON SATN MFR SERPL: 19 % — SIGNIFICANT CHANGE UP (ref 14–50)
IRON SATN MFR SERPL: 96 UG/DL — SIGNIFICANT CHANGE UP (ref 30–160)
LDH SERPL L TO P-CCNC: 169 U/L — SIGNIFICANT CHANGE UP (ref 135–225)
LYMPHOCYTES # BLD AUTO: 2.13 K/UL — SIGNIFICANT CHANGE UP (ref 1–3.3)
LYMPHOCYTES # BLD AUTO: 23.5 % — SIGNIFICANT CHANGE UP (ref 13–44)
MCHC RBC-ENTMCNC: 30.4 PG — SIGNIFICANT CHANGE UP (ref 27–34)
MCHC RBC-ENTMCNC: 32.8 G/DL — SIGNIFICANT CHANGE UP (ref 32–36)
MCV RBC AUTO: 92.7 FL — SIGNIFICANT CHANGE UP (ref 80–100)
MONOCYTES # BLD AUTO: 0.59 K/UL — SIGNIFICANT CHANGE UP (ref 0–0.9)
MONOCYTES NFR BLD AUTO: 6.5 % — SIGNIFICANT CHANGE UP (ref 2–14)
NEUTROPHILS # BLD AUTO: 6.19 K/UL — SIGNIFICANT CHANGE UP (ref 1.8–7.4)
NEUTROPHILS NFR BLD AUTO: 68.3 % — SIGNIFICANT CHANGE UP (ref 43–77)
NRBC # BLD: 0 /100 WBCS — SIGNIFICANT CHANGE UP (ref 0–0)
NRBC # FLD: 0 K/UL — SIGNIFICANT CHANGE UP (ref 0–0)
PLATELET # BLD AUTO: 209 K/UL — SIGNIFICANT CHANGE UP (ref 150–400)
POTASSIUM SERPL-MCNC: 3.9 MMOL/L — SIGNIFICANT CHANGE UP (ref 3.5–5.3)
POTASSIUM SERPL-MCNC: 4.3 MMOL/L — SIGNIFICANT CHANGE UP (ref 3.5–5.3)
POTASSIUM SERPL-SCNC: 3.9 MMOL/L — SIGNIFICANT CHANGE UP (ref 3.5–5.3)
POTASSIUM SERPL-SCNC: 4.3 MMOL/L — SIGNIFICANT CHANGE UP (ref 3.5–5.3)
PROT SERPL-MCNC: 6 G/DL — SIGNIFICANT CHANGE UP (ref 6–8.3)
PROT SERPL-MCNC: 6.3 G/DL — SIGNIFICANT CHANGE UP (ref 6–8.3)
PROTHROM AB SERPL-ACNC: 10.3 SEC — SIGNIFICANT CHANGE UP (ref 9.9–13.4)
RBC # BLD: 3.72 M/UL — LOW (ref 3.8–5.2)
RBC # FLD: 14.1 % — SIGNIFICANT CHANGE UP (ref 10.3–14.5)
SODIUM SERPL-SCNC: 135 MMOL/L — SIGNIFICANT CHANGE UP (ref 135–145)
SODIUM SERPL-SCNC: 137 MMOL/L — SIGNIFICANT CHANGE UP (ref 135–145)
TIBC SERPL-MCNC: 512 UG/DL — HIGH (ref 220–430)
UIBC SERPL-MCNC: 416 UG/DL — HIGH (ref 110–370)
URATE SERPL-MCNC: 4.9 MG/DL — SIGNIFICANT CHANGE UP (ref 2.5–7)
VIT B12 SERPL-MCNC: 295 PG/ML — SIGNIFICANT CHANGE UP (ref 232–1245)
WBC # BLD: 9.06 K/UL — SIGNIFICANT CHANGE UP (ref 3.8–10.5)
WBC # FLD AUTO: 9.06 K/UL — SIGNIFICANT CHANGE UP (ref 3.8–10.5)

## 2024-11-21 PROCEDURE — 76816 OB US FOLLOW-UP PER FETUS: CPT | Mod: 26,59

## 2024-11-21 PROCEDURE — 76820 UMBILICAL ARTERY ECHO: CPT | Mod: 26,59

## 2024-11-21 PROCEDURE — ZZZZZ: CPT

## 2024-11-21 PROCEDURE — 76818 FETAL BIOPHYS PROFILE W/NST: CPT | Mod: 26,59

## 2024-11-21 PROCEDURE — 76816 OB US FOLLOW-UP PER FETUS: CPT | Mod: 26

## 2024-11-22 ENCOUNTER — NON-APPOINTMENT (OUTPATIENT)
Age: 39
End: 2024-11-22

## 2024-11-25 ENCOUNTER — APPOINTMENT (OUTPATIENT)
Dept: ANTEPARTUM | Facility: CLINIC | Age: 39
End: 2024-11-25
Payer: MEDICAID

## 2024-11-25 ENCOUNTER — APPOINTMENT (OUTPATIENT)
Dept: ANTEPARTUM | Facility: CLINIC | Age: 39
End: 2024-11-25

## 2024-11-25 ENCOUNTER — ASOB RESULT (OUTPATIENT)
Age: 39
End: 2024-11-25

## 2024-11-25 ENCOUNTER — OUTPATIENT (OUTPATIENT)
Dept: OUTPATIENT SERVICES | Facility: HOSPITAL | Age: 39
LOS: 1 days | End: 2024-11-25

## 2024-11-25 ENCOUNTER — APPOINTMENT (OUTPATIENT)
Dept: ANTEPARTUM | Facility: HOSPITAL | Age: 39
End: 2024-11-25
Payer: MEDICAID

## 2024-11-25 VITALS
TEMPERATURE: 97.7 F | HEART RATE: 71 BPM | WEIGHT: 257 LBS | SYSTOLIC BLOOD PRESSURE: 134 MMHG | BODY MASS INDEX: 40.34 KG/M2 | DIASTOLIC BLOOD PRESSURE: 87 MMHG | HEIGHT: 67 IN

## 2024-11-25 DIAGNOSIS — Z98.890 OTHER SPECIFIED POSTPROCEDURAL STATES: Chronic | ICD-10-CM

## 2024-11-25 PROCEDURE — 99213 OFFICE O/P EST LOW 20 MIN: CPT | Mod: TH,GC,25

## 2024-11-25 PROCEDURE — 76818 FETAL BIOPHYS PROFILE W/NST: CPT | Mod: 59

## 2024-11-25 PROCEDURE — ZZZZZ: CPT

## 2024-11-25 PROCEDURE — 76820 UMBILICAL ARTERY ECHO: CPT | Mod: 59

## 2024-11-25 PROCEDURE — 76821 MIDDLE CEREBRAL ARTERY ECHO: CPT | Mod: 59

## 2024-11-25 PROCEDURE — 76820 UMBILICAL ARTERY ECHO: CPT

## 2024-11-26 DIAGNOSIS — O10.913 UNSPECIFIED PRE-EXISTING HYPERTENSION COMPLICATING PREGNANCY, THIRD TRIMESTER: ICD-10-CM

## 2024-11-26 DIAGNOSIS — O36.5990 MATERNAL CARE FOR OTHER KNOWN OR SUSPECTED POOR FETAL GROWTH, UNSPECIFIED TRIMESTER, NOT APPLICABLE OR UNSPECIFIED: ICD-10-CM

## 2024-11-26 DIAGNOSIS — O26.899 OTHER SPECIFIED PREGNANCY RELATED CONDITIONS, UNSPECIFIED TRIMESTER: ICD-10-CM

## 2024-11-26 DIAGNOSIS — O30.049 TWIN PREGNANCY, DICHORIONIC/DIAMNIOTIC, UNSPECIFIED TRIMESTER: ICD-10-CM

## 2024-11-27 ENCOUNTER — TRANSCRIPTION ENCOUNTER (OUTPATIENT)
Age: 39
End: 2024-11-27

## 2024-11-27 ENCOUNTER — NON-APPOINTMENT (OUTPATIENT)
Age: 39
End: 2024-11-27

## 2024-11-27 ENCOUNTER — INPATIENT (INPATIENT)
Facility: HOSPITAL | Age: 39
LOS: 3 days | Discharge: HOME HEALTH | End: 2024-12-01
Attending: SPECIALIST | Admitting: SPECIALIST
Payer: MEDICAID

## 2024-11-27 VITALS
TEMPERATURE: 98 F | HEART RATE: 67 BPM | RESPIRATION RATE: 18 BRPM | SYSTOLIC BLOOD PRESSURE: 134 MMHG | DIASTOLIC BLOOD PRESSURE: 72 MMHG

## 2024-11-27 DIAGNOSIS — Z98.890 OTHER SPECIFIED POSTPROCEDURAL STATES: Chronic | ICD-10-CM

## 2024-11-27 DIAGNOSIS — B00.9 HERPESVIRAL INFECTION, UNSPECIFIED: ICD-10-CM

## 2024-11-27 LAB
ALBUMIN SERPL ELPH-MCNC: 3.5 G/DL — SIGNIFICANT CHANGE UP (ref 3.3–5)
ALP SERPL-CCNC: 171 U/L — HIGH (ref 40–120)
ALT FLD-CCNC: 7 U/L — SIGNIFICANT CHANGE UP (ref 4–33)
ANION GAP SERPL CALC-SCNC: 12 MMOL/L — SIGNIFICANT CHANGE UP (ref 7–14)
APPEARANCE UR: ABNORMAL
AST SERPL-CCNC: 15 U/L — SIGNIFICANT CHANGE UP (ref 4–32)
BACTERIA # UR AUTO: ABNORMAL /HPF
BASOPHILS # BLD AUTO: 0.02 K/UL — SIGNIFICANT CHANGE UP (ref 0–0.2)
BASOPHILS NFR BLD AUTO: 0.2 % — SIGNIFICANT CHANGE UP (ref 0–2)
BILIRUB SERPL-MCNC: 0.2 MG/DL — SIGNIFICANT CHANGE UP (ref 0.2–1.2)
BILIRUB UR-MCNC: NEGATIVE — SIGNIFICANT CHANGE UP
BLD GP AB SCN SERPL QL: NEGATIVE — SIGNIFICANT CHANGE UP
BUN SERPL-MCNC: 9 MG/DL — SIGNIFICANT CHANGE UP (ref 7–23)
CALCIUM SERPL-MCNC: 9 MG/DL — SIGNIFICANT CHANGE UP (ref 8.4–10.5)
CAST: 0 /LPF — SIGNIFICANT CHANGE UP (ref 0–4)
CHLORIDE SERPL-SCNC: 105 MMOL/L — SIGNIFICANT CHANGE UP (ref 98–107)
CO2 SERPL-SCNC: 22 MMOL/L — SIGNIFICANT CHANGE UP (ref 22–31)
COLOR SPEC: YELLOW — SIGNIFICANT CHANGE UP
CREAT ?TM UR-MCNC: 128 MG/DL — SIGNIFICANT CHANGE UP
CREAT SERPL-MCNC: 0.58 MG/DL — SIGNIFICANT CHANGE UP (ref 0.5–1.3)
DIFF PNL FLD: NEGATIVE — SIGNIFICANT CHANGE UP
EGFR: 119 ML/MIN/1.73M2 — SIGNIFICANT CHANGE UP
EOSINOPHIL # BLD AUTO: 0.04 K/UL — SIGNIFICANT CHANGE UP (ref 0–0.5)
EOSINOPHIL NFR BLD AUTO: 0.5 % — SIGNIFICANT CHANGE UP (ref 0–6)
GLUCOSE SERPL-MCNC: 71 MG/DL — SIGNIFICANT CHANGE UP (ref 70–99)
GLUCOSE UR QL: NEGATIVE MG/DL — SIGNIFICANT CHANGE UP
HCT VFR BLD CALC: 34.4 % — LOW (ref 34.5–45)
HCT VFR BLD CALC: 35.5 % — SIGNIFICANT CHANGE UP (ref 34.5–45)
HGB BLD-MCNC: 11.6 G/DL — SIGNIFICANT CHANGE UP (ref 11.5–15.5)
HGB BLD-MCNC: 11.7 G/DL — SIGNIFICANT CHANGE UP (ref 11.5–15.5)
IANC: 5.82 K/UL — SIGNIFICANT CHANGE UP (ref 1.8–7.4)
IMM GRANULOCYTES NFR BLD AUTO: 0.7 % — SIGNIFICANT CHANGE UP (ref 0–0.9)
KETONES UR-MCNC: ABNORMAL MG/DL
LDH SERPL L TO P-CCNC: 188 U/L — SIGNIFICANT CHANGE UP (ref 135–225)
LEUKOCYTE ESTERASE UR-ACNC: ABNORMAL
LYMPHOCYTES # BLD AUTO: 2.11 K/UL — SIGNIFICANT CHANGE UP (ref 1–3.3)
LYMPHOCYTES # BLD AUTO: 24.6 % — SIGNIFICANT CHANGE UP (ref 13–44)
MCHC RBC-ENTMCNC: 30.5 PG — SIGNIFICANT CHANGE UP (ref 27–34)
MCHC RBC-ENTMCNC: 31 PG — SIGNIFICANT CHANGE UP (ref 27–34)
MCHC RBC-ENTMCNC: 33 G/DL — SIGNIFICANT CHANGE UP (ref 32–36)
MCHC RBC-ENTMCNC: 33.7 G/DL — SIGNIFICANT CHANGE UP (ref 32–36)
MCV RBC AUTO: 92 FL — SIGNIFICANT CHANGE UP (ref 80–100)
MCV RBC AUTO: 92.7 FL — SIGNIFICANT CHANGE UP (ref 80–100)
MONOCYTES # BLD AUTO: 0.51 K/UL — SIGNIFICANT CHANGE UP (ref 0–0.9)
MONOCYTES NFR BLD AUTO: 6 % — SIGNIFICANT CHANGE UP (ref 2–14)
NEUTROPHILS # BLD AUTO: 5.82 K/UL — SIGNIFICANT CHANGE UP (ref 1.8–7.4)
NEUTROPHILS NFR BLD AUTO: 68 % — SIGNIFICANT CHANGE UP (ref 43–77)
NITRITE UR-MCNC: NEGATIVE — SIGNIFICANT CHANGE UP
NRBC # BLD: 0 /100 WBCS — SIGNIFICANT CHANGE UP (ref 0–0)
NRBC # BLD: 0 /100 WBCS — SIGNIFICANT CHANGE UP (ref 0–0)
NRBC # FLD: 0 K/UL — SIGNIFICANT CHANGE UP (ref 0–0)
NRBC # FLD: 0 K/UL — SIGNIFICANT CHANGE UP (ref 0–0)
PH UR: 6.5 — SIGNIFICANT CHANGE UP (ref 5–8)
PLATELET # BLD AUTO: 198 K/UL — SIGNIFICANT CHANGE UP (ref 150–400)
PLATELET # BLD AUTO: 209 K/UL — SIGNIFICANT CHANGE UP (ref 150–400)
POTASSIUM SERPL-MCNC: 4 MMOL/L — SIGNIFICANT CHANGE UP (ref 3.5–5.3)
POTASSIUM SERPL-SCNC: 4 MMOL/L — SIGNIFICANT CHANGE UP (ref 3.5–5.3)
PROT ?TM UR-MCNC: 16 MG/DL — SIGNIFICANT CHANGE UP
PROT SERPL-MCNC: 6.9 G/DL — SIGNIFICANT CHANGE UP (ref 6–8.3)
PROT UR-MCNC: NEGATIVE MG/DL — SIGNIFICANT CHANGE UP
PROT/CREAT UR-RTO: 0.1 RATIO — SIGNIFICANT CHANGE UP (ref 0–0.2)
RBC # BLD: 3.74 M/UL — LOW (ref 3.8–5.2)
RBC # BLD: 3.83 M/UL — SIGNIFICANT CHANGE UP (ref 3.8–5.2)
RBC # FLD: 13.9 % — SIGNIFICANT CHANGE UP (ref 10.3–14.5)
RBC # FLD: 14.1 % — SIGNIFICANT CHANGE UP (ref 10.3–14.5)
RBC CASTS # UR COMP ASSIST: 1 /HPF — SIGNIFICANT CHANGE UP (ref 0–4)
RH IG SCN BLD-IMP: NEGATIVE — SIGNIFICANT CHANGE UP
SODIUM SERPL-SCNC: 139 MMOL/L — SIGNIFICANT CHANGE UP (ref 135–145)
SP GR SPEC: 1.02 — SIGNIFICANT CHANGE UP (ref 1–1.03)
SQUAMOUS # UR AUTO: 8 /HPF — HIGH (ref 0–5)
T PALLIDUM AB TITR SER: NEGATIVE — SIGNIFICANT CHANGE UP
URATE SERPL-MCNC: 5.3 MG/DL — SIGNIFICANT CHANGE UP (ref 2.5–7)
UROBILINOGEN FLD QL: 0.2 MG/DL — SIGNIFICANT CHANGE UP (ref 0.2–1)
WBC # BLD: 16.08 K/UL — HIGH (ref 3.8–10.5)
WBC # BLD: 8.56 K/UL — SIGNIFICANT CHANGE UP (ref 3.8–10.5)
WBC # FLD AUTO: 16.08 K/UL — HIGH (ref 3.8–10.5)
WBC # FLD AUTO: 8.56 K/UL — SIGNIFICANT CHANGE UP (ref 3.8–10.5)
WBC UR QL: 17 /HPF — HIGH (ref 0–5)

## 2024-11-27 PROCEDURE — 88302 TISSUE EXAM BY PATHOLOGIST: CPT | Mod: 26

## 2024-11-27 PROCEDURE — 88307 TISSUE EXAM BY PATHOLOGIST: CPT | Mod: 26

## 2024-11-27 PROCEDURE — 59514 CESAREAN DELIVERY ONLY: CPT | Mod: U7,GC,22

## 2024-11-27 DEVICE — SURGICEL NU-KNIT 6 X 9": Type: IMPLANTABLE DEVICE | Status: FUNCTIONAL

## 2024-11-27 RX ORDER — DIPHENHYDRAMINE HCL 25 MG
25 CAPSULE ORAL EVERY 6 HOURS
Refills: 0 | Status: DISCONTINUED | OUTPATIENT
Start: 2024-11-27 | End: 2024-12-01

## 2024-11-27 RX ORDER — ACETAMINOPHEN 500MG 500 MG/1
975 TABLET, COATED ORAL
Refills: 0 | Status: DISCONTINUED | OUTPATIENT
Start: 2024-11-27 | End: 2024-12-01

## 2024-11-27 RX ORDER — TETANUS TOXOID, REDUCED DIPHTHERIA TOXOID AND ACELLULAR PERTUSSIS VACCINE, ADSORBED 5; 2.5; 8; 8; 2.5 [IU]/.5ML; [IU]/.5ML; UG/.5ML; UG/.5ML; UG/.5ML
0.5 SUSPENSION INTRAMUSCULAR ONCE
Refills: 0 | Status: DISCONTINUED | OUTPATIENT
Start: 2024-11-27 | End: 2024-12-01

## 2024-11-27 RX ORDER — HEPARIN SODIUM,PORCINE 1000/ML
5000 VIAL (ML) INJECTION EVERY 12 HOURS
Refills: 0 | Status: DISCONTINUED | OUTPATIENT
Start: 2024-11-27 | End: 2024-11-28

## 2024-11-27 RX ORDER — KETOROLAC TROMETHAMINE 30 MG/ML
30 INJECTION INTRAMUSCULAR; INTRAVENOUS EVERY 6 HOURS
Refills: 0 | Status: DISCONTINUED | OUTPATIENT
Start: 2024-11-27 | End: 2024-11-28

## 2024-11-27 RX ORDER — ONDANSETRON HYDROCHLORIDE 4 MG/1
4 TABLET, FILM COATED ORAL EVERY 6 HOURS
Refills: 0 | Status: DISCONTINUED | OUTPATIENT
Start: 2024-11-28 | End: 2024-11-29

## 2024-11-27 RX ORDER — CHLORHEXIDINE GLUCONATE 1.2 MG/ML
1 RINSE ORAL DAILY
Refills: 0 | Status: DISCONTINUED | OUTPATIENT
Start: 2024-11-27 | End: 2024-11-28

## 2024-11-27 RX ORDER — NALBUPHINE HYDROCHLORIDE 10 MG/ML
2.5 INJECTION INTRAMUSCULAR; INTRAVENOUS; SUBCUTANEOUS EVERY 6 HOURS
Refills: 0 | Status: DISCONTINUED | OUTPATIENT
Start: 2024-11-28 | End: 2024-11-29

## 2024-11-27 RX ORDER — TRISODIUM CITRATE DIHYDRATE AND CITRIC ACID MONOHYDRATE 500; 334 MG/5ML; MG/5ML
30 SOLUTION ORAL ONCE
Refills: 0 | Status: COMPLETED | OUTPATIENT
Start: 2024-11-27 | End: 2024-11-27

## 2024-11-27 RX ORDER — OXYCODONE HYDROCHLORIDE 30 MG/1
5 TABLET ORAL ONCE
Refills: 0 | Status: DISCONTINUED | OUTPATIENT
Start: 2024-11-27 | End: 2024-12-01

## 2024-11-27 RX ORDER — DEXAMETHASONE 1.5 MG/1
4 TABLET ORAL EVERY 6 HOURS
Refills: 0 | Status: DISCONTINUED | OUTPATIENT
Start: 2024-11-28 | End: 2024-11-29

## 2024-11-27 RX ORDER — OXYCODONE HYDROCHLORIDE 30 MG/1
5 TABLET ORAL
Refills: 0 | Status: DISCONTINUED | OUTPATIENT
Start: 2024-11-27 | End: 2024-12-01

## 2024-11-27 RX ORDER — 0.9 % SODIUM CHLORIDE 0.9 %
500 INTRAVENOUS SOLUTION INTRAVENOUS ONCE
Refills: 0 | Status: COMPLETED | OUTPATIENT
Start: 2024-11-27 | End: 2024-11-27

## 2024-11-27 RX ORDER — 0.9 % SODIUM CHLORIDE 0.9 %
1000 INTRAVENOUS SOLUTION INTRAVENOUS
Refills: 0 | Status: DISCONTINUED | OUTPATIENT
Start: 2024-11-27 | End: 2024-12-01

## 2024-11-27 RX ORDER — OXYCODONE HYDROCHLORIDE 30 MG/1
5 TABLET ORAL
Refills: 0 | Status: DISCONTINUED | OUTPATIENT
Start: 2024-11-28 | End: 2024-11-29

## 2024-11-27 RX ORDER — NALOXONE HCL 0.4 MG/ML
0.1 AMPUL (ML) INJECTION
Refills: 0 | Status: DISCONTINUED | OUTPATIENT
Start: 2024-11-28 | End: 2024-11-29

## 2024-11-27 RX ORDER — DIPHENHYDRAMINE HCL 25 MG
25 CAPSULE ORAL EVERY 4 HOURS
Refills: 0 | Status: DISCONTINUED | OUTPATIENT
Start: 2024-11-28 | End: 2024-11-29

## 2024-11-27 RX ORDER — SIMETHICONE 125 MG
80 CAPSULE ORAL EVERY 4 HOURS
Refills: 0 | Status: DISCONTINUED | OUTPATIENT
Start: 2024-11-27 | End: 2024-12-01

## 2024-11-27 RX ORDER — LANOLIN 72 %
1 OINTMENT (GRAM) TOPICAL EVERY 6 HOURS
Refills: 0 | Status: DISCONTINUED | OUTPATIENT
Start: 2024-11-27 | End: 2024-12-01

## 2024-11-27 RX ORDER — FAMOTIDINE 20 MG/1
20 TABLET, FILM COATED ORAL ONCE
Refills: 0 | Status: COMPLETED | OUTPATIENT
Start: 2024-11-27 | End: 2024-11-27

## 2024-11-27 RX ORDER — IBUPROFEN 200 MG
600 TABLET ORAL EVERY 6 HOURS
Refills: 0 | Status: COMPLETED | OUTPATIENT
Start: 2024-11-27 | End: 2025-10-26

## 2024-11-27 RX ADMIN — Medication 1000 MILLILITER(S): at 20:25

## 2024-11-27 RX ADMIN — ACETAMINOPHEN 500MG 975 MILLIGRAM(S): 500 TABLET, COATED ORAL at 22:05

## 2024-11-27 RX ADMIN — FAMOTIDINE 20 MILLIGRAM(S): 20 TABLET, FILM COATED ORAL at 12:30

## 2024-11-27 RX ADMIN — ACETAMINOPHEN 500MG 975 MILLIGRAM(S): 500 TABLET, COATED ORAL at 21:45

## 2024-11-27 RX ADMIN — CHLORHEXIDINE GLUCONATE 1 APPLICATION(S): 1.2 RINSE ORAL at 12:30

## 2024-11-27 RX ADMIN — Medication 200 MILLILITER(S): at 12:30

## 2024-11-27 RX ADMIN — Medication 5000 UNIT(S): at 21:45

## 2024-11-27 RX ADMIN — TRISODIUM CITRATE DIHYDRATE AND CITRIC ACID MONOHYDRATE 30 MILLILITER(S): 500; 334 SOLUTION ORAL at 13:33

## 2024-11-27 RX ADMIN — Medication 42 MILLIUNIT(S)/MIN: at 21:45

## 2024-11-27 NOTE — OB RN PATIENT PROFILE - FUNCTIONAL ASSESSMENT - BASIC MOBILITY SCORE HIDDEN
July 15, 2022    Information collected during check out    My name is Nena    RADHA PHILLIP [facility]  [address] [address] [phone].            Tell me about your visit with us.      Overall were you satisfied with your first visit to our practice? Yes       I appreciate you taking the time to speak with me today. Is there anything else I can do for you? NO      Thank you, and have a great day.      
24

## 2024-11-27 NOTE — OB RN DELIVERY SUMMARY - NS_PROPHYLABX_OBGYN_ALL_OB
Never smoker
Principal Discharge DX:	Acute deep vein thrombosis (DVT) of popliteal vein of right lower extremity
Yes

## 2024-11-27 NOTE — OB PROVIDER H&P - NSHPSOCIALHISTORY_GEN_ALL_CORE
Lives at home with  and children, feels safe. Denies alcohol/tobacco/drug use during pregnancy. HSV on valtrex, Denies history of Gonorrhea, Chlamydia, HIV, STIs.    Psych: Denies PPD, depression, anxiety

## 2024-11-27 NOTE — OB RN INTRAOPERATIVE NOTE - NS_ROOMOUT_OBGYN_ALL_OB_DT
27-Nov-2024 18:07 Banner Transposition Flap Text: The defect edges were debeveled with a #15 scalpel blade.  Given the location of the defect and the proximity to free margins a Banner transposition flap was deemed most appropriate.  Using a sterile surgical marker, an appropriate flap drawn around the defect. The area thus outlined was incised deep to adipose tissue with a #15 scalpel blade.  The skin margins were undermined to an appropriate distance in all directions utilizing iris scissors.

## 2024-11-27 NOTE — OB PROVIDER H&P - NSICDXPASTMEDICALHX_GEN_ALL_CORE_FT
PAST MEDICAL HISTORY:  Chronic hypertension     Cystic fibrosis carrier     Fibroids     Herpes simplex

## 2024-11-27 NOTE — OB PROVIDER H&P - ATTENDING COMMENTS
Agree with above  Pt desires pLTCS+BS  Consents signed and witnessed  Sterilization consent re-signed  To OR    zaid CARTER

## 2024-11-27 NOTE — OB PROVIDER DELIVERY SUMMARY - AS DELIV COMPLICATIONS OB
none
That inpatient services furnished since the previous certification or recertification were, and continue to be required: for treatment that could reasonably be expected to improve the patient's condition; or, for diagnostic study;    That the hospital records show that the services furnished were: intensive treatment services; admission and related services necessary for diagnostic study or equivalent services;    That the patient continues to need, on a daily basis active inpatient treatment furnished directly by or requiring the supervision of inpatient psychiatric facility personnel.

## 2024-11-27 NOTE — OB NEONATOLOGY/PEDIATRICIAN DELIVERY SUMMARY - NSPEDSNEONOTESB_OBGYN_ALL_OB_FT
36.2 wk male born SGA via primary elective CS to a 39 y/o  blood type A - mother, received Rhogam. Maternal history of cHTN, HSV (outbreak during pregnancy, fibroids, anemia, CF carrier. Prenatal history of abnormal umbilical artery dopplers and IUGR. PNL HIV -/Hep B-/RPR non-reactive/Rubella immune, GBS +on 10/14/24. AROM at delivery with clear fluids. PEDS called for twin delivery with Twin B IUGR. Baby emerged vigorous, crying, was w/d/s/s with APGARS of 9/9. Mom plans to initiate breastfeeding and formula feeding, consents to Hep B vaccine, and declines circ. Highest maternal temp 36.9C with EOS of 0.15. Admitted to NICU due to birthweight less than 2kg.  : 24  TOB: 1619  Birth weight: 3610g (0.35%)    Physical Exam:  Gen: no acute distress, +grimace  HEENT:  anterior fontanel open soft and flat, nondysmorphic facies, no cleft lip/palate, ears normal set, no ear pits or tags, nares clinically patent  Resp: Normal respiratory effort without grunting or retractions, good air entry b/l, clear to auscultation bilaterally  Cardio: Present S1/S2, regular rate and rhythm, no murmurs  Abd: soft, non tender, non distended, umbilical cord with 3 vessels  Neuro: +palmar and plantar grasp, +suck, +noah, normal tone  Extremities: negative davies and ortolani maneuvers, moving all extremities, no clavicular crepitus or stepoff  Skin: pink, warm  Genitals: Hooded foreskin, otherwise Normal male anatomy, testicles palpable in scrotum b/l, Jose 1, anus patent Twin A   History and Physical Exam: Peds called to OR for birth of Gina IUGR 36.2 wk twins. TwinA is AGA male born via CS to a 37 y/o  mother.  Maternal medical/surgical/pregnancy history of HSV outbreak (not primary) during pregnancy, CF carrier (dad not tested). Maternal labs include Blood Type A- (rhogam given), HIV - , RPR NR , Rubella I , Hep B - , GBS +. ROM at delivery on  with clear  fluids.  Baby emerged vigorous, crying, was warmed, dried, suctioned, and stimulated with APGARS of 8/9. Mom plans to initiate breastfeeding / formula feed, consents Hep B vaccine.  Highest maternal temp: 36.9. EOS not calculated due to rupture at delivery    :   TOB: 1649  Weight: 2450    Physical Exam:  Gen: no acute distress, +grimace  HEENT:  anterior fontanel open soft and flat, nondysmorphic facies, no cleft lip/palate, ears normal set, no ear pits or tags, nares clinically patent  Resp: Normal respiratory effort without grunting or retractions, good air entry b/l, clear to auscultation bilaterally  Cardio: Present S1/S2, regular rate and rhythm, no murmurs  Abd: soft, non tender, non distended, umbilical cord with 3 vessels  Neuro: +palmar and plantar grasp, +suck, +noah, normal tone  Extremities: negative davies and ortolani maneuvers, moving all extremities, no clavicular crepitus or stepoff  Skin: pink, warm  Genitals: Normal male anatomy, testicles palpable in scrotum b/l, Jose 1, anus patent, urination on exam      ------------------------------  Twin B  36.2 wk male born SGA via primary elective CS to a 37 y/o  blood type A - mother, received Rhogam. Maternal history of cHTN, HSV (outbreak during pregnancy, fibroids, anemia, CF carrier. Prenatal history of abnormal umbilical artery dopplers and IUGR. PNL HIV -/Hep B-/RPR non-reactive/Rubella immune, GBS +on 10/14/24. AROM at delivery with clear fluids. PEDS called for twin delivery with Twin B IUGR. Baby emerged vigorous, crying, was w/d/s/s with APGARS of 9/9. Mom plans to initiate breastfeeding and formula feeding, consents to Hep B vaccine, and declines circ. Highest maternal temp 36.9C with EOS of 0.15. Admitted to NICU due to birthweight less than 2kg.  : 24  TOB: 1619  Birth weight: 3610g (0.35%)    Physical Exam:  Gen: no acute distress, +grimace  HEENT:  anterior fontanel open soft and flat, nondysmorphic facies, no cleft lip/palate, ears normal set, no ear pits or tags, nares clinically patent  Resp: Normal respiratory effort without grunting or retractions, good air entry b/l, clear to auscultation bilaterally  Cardio: Present S1/S2, regular rate and rhythm, no murmurs  Abd: soft, non tender, non distended, umbilical cord with 3 vessels  Neuro: +palmar and plantar grasp, +suck, +noah, normal tone  Extremities: negative davies and ortolani maneuvers, moving all extremities, no clavicular crepitus or stepoff  Skin: pink, warm  Genitals: Hooded foreskin, otherwise Normal male anatomy, testicles palpable in scrotum b/l, Jose 1, anus patent

## 2024-11-27 NOTE — OB PROVIDER DELIVERY SUMMARY - NSPROVIDERDELIVERYNOTE_OBGYN_ALL_OB_FT
primary LTCS +bilateral salpingectomy   Baby A: viable male infant, vertex presentation, 2540g, Apgars 9/9, cord gasses sent  Baby B: viable male infant, vertex presentation, 1620g, Apgars 9/9, cord gasses sent  Grossly normal ovaries, fibroid uterus with thickened myometrium, fallopian tubes with small paratubal cysts bilaterally        EBL: 1291  IVF: 2000  UOP: 100    Dictation #:[id number]    Lolis Saenz, PGY-2

## 2024-11-27 NOTE — OB RN PATIENT PROFILE - NSMATERNALFETALCONCERNS_OBGYN_ALL_OB_FT
Maternal/Fetal Alert  AMA , carrier CF , no testing on partner   di di twins ; TWIN B 1%   tx of CARE from [facility] OB CARE TO [facility] SECONDARY TO IUGR of twin B with elevated dopplers   intermittent care ( travel from [facility] )   ALERT NICU   Lisandra Ayala RN 9/30/2024    Maternal/Fetal Alert  AMA , DI DI twins

## 2024-11-27 NOTE — OB RN PATIENT PROFILE - HEIGHT IN FEET
December 1, 2023         Patient: Maritza Johnston   YOB: 1969   Date of Visit: 12/1/2023           To Whom it May Concern:    Maritza Johnston was seen in my clinic on 12/1/2023.     If you have any questions or concerns, please don't hesitate to call.        Sincerely,           Elizabeth Franco P.A.-C.  Electronically Signed     
5

## 2024-11-27 NOTE — OB PROVIDER H&P - HISTORY OF PRESENT ILLNESS
37yo female  dichorionic diamniotic TIUP VIBHA 24 presents @36.2 weeks for scheduled primary  section for multiple gestation in the setting of severe IUGR and bilateral salpingectomy. Denies shortness of breath, fever, chills or cough.  Denies vaginal bleeding, leakage of fluids, or contractions today. Reports positive fetal movement.    Primary OB: Transferred to [facility] High risk clinic @ 30 weeks from [facility] for IUGR, pt went to [facility] in the summer x2 months for vacation  Antepartum Course: anatomy scan wnl as per antepartum record, GCT elevated x2, GTT wnl x2, GBS in urine 24, NIPT low risk as per antepartum record, HSV2 outbreak 24 finished 7 day Valtrex course, currently taking prophylactic 500mg bid, s/p Rhogam @8 weeks for vaginal bleeding and @7 months, pt was placed on Labetalol 100mg bid when she went to [facility], pt was admitted to antepartum service for IUGR, at that time she received BMZ course (10/14-), 24 hour urine 96, pt was also discontinued from her labetalol at that time, unclear gestational HTN or chronic HTN, [facility] blood pressures normal, no vitals from patient's time in [facility], maternal carrier for Cystic fibrosis (FOB not tested)  Prenatal labs:  -T&S: A-  -Rubella: Immune  -Hep B: Nonreactive  -Hep C: Nonreactive  -HIV: Nonreactive  -RPR: Negative  Ultrasounds: Last MFM sonogram   Fetus A: Maternal left, Posterior placenta, vertex, IUGR, 2104g 7%, AC 10%, elevated UAD  Fetus B: Maternal right, posterior placenta, vertex, severe IUGR, 1512g <1%, AC <1%, elevated UAD

## 2024-11-27 NOTE — OB PROVIDER H&P - ALERT: PERTINENT HISTORY
Patient desires tubal ligation/1st Trimester Sonogram/BioPhysical Profile(s)/Follow up Sonogram for Growth/Non Invasive Prenatal Screen (NIPS)

## 2024-11-27 NOTE — OB NEONATOLOGY/PEDIATRICIAN DELIVERY SUMMARY - NSMATERNALFETALCONCERNS_OBGYN_ALL_OB_FT
Maternal/Fetal Alert  AMA , carrier CF , no testing on partner   di di twins ; TWIN B 1%   tx of CARE from [facility] OB CARE TO [facility] SECONDARY TO IUGR of twin B with elevated dopplers   intermittent care ( travel from [facility] )   ALERT NICU   Lisandra Ayala RN 9/30/2024    Maternal/Fetal Alert  AMA , DI DI twins

## 2024-11-27 NOTE — OB RN INTRAOPERATIVE NOTE - NSSELHIDDEN_OBGYN_ALL_OB_FT
[NS_DeliveryAttending1_OBGYN_ALL_OB_FT:[id number]],[NS_DeliveryAssist1_OBGYN_ALL_OB_FT:[id number]],[NS_DeliveryRN_OBGYN_ALL_OB_FT:[id number]] [NS_DeliveryAttending1_OBGYN_ALL_OB_FT:[id number]],[NS_DeliveryAssist1_OBGYN_ALL_OB_FT:[id number]],[NS_DeliveryRN_OBGYN_ALL_OB_FT:[id number]],[NS_DeliveryAttending2_OBGYN_ALL_OB_FT:[id number]]

## 2024-11-27 NOTE — OB PROVIDER H&P - NSHPPHYSICALEXAM_GEN_ALL_CORE
Vitals:   General: A&O x3  Neuro: symmetrical facial features, no slurring of words  Lungs: breathing is unlabored  Extremities: full range of motion x4  Abdomen: Soft, Gravid, TOCO in place    NST pending    Limited Bedside Ultrasound:   Fetus A: cephalic presentation, posterior placenta, Fetal HR movement seen  Fetus B: cephalic presentation, posterior placenta, Fetal HR movement seen    Neuro: grossly intact Vitals:   General: A&O x3  Neuro: symmetrical facial features, no slurring of words  Lungs: breathing is unlabored  Extremities: full range of motion x4  Abdomen: Soft, Gravid, TOCO in place    NST   Fetus A: Basline , moderate variability, +accels, -decels, Cat 1, reactive  Fetus B: Baseline , moderate variability, +accels, -decels, Cat 1, reactive  TOCO: contractions noted, irregular     Limited Bedside Ultrasound:   Fetus A: cephalic presentation, posterior placenta, Fetal HR movement seen  Fetus B: cephalic presentation, posterior placenta, Fetal HR movement seen    SSE: negative     Neuro: grossly intact

## 2024-11-27 NOTE — OB PROVIDER DELIVERY SUMMARY - NS_SCHEDBEFORE39_OBGYN_ALL_OB
Hypertensive Disorder/Multiple Gestation/BPP Abnormalities IUGR/Hypertensive Disorder/Multiple Gestation

## 2024-11-27 NOTE — OB PROVIDER H&P - NSMODPPHRISK_OBGYN_A_OB
Over-distended uterus: Multiple gestation, polyhydramnios, Macrosomia/BMI > 40/AMA - over 35 years of age

## 2024-11-27 NOTE — OB RN DELIVERY SUMMARY - NSMATERNALFETALCONCERNS_OBGYN_ALL_OB_FT
Maternal/Fetal Alert  AMA , carrier CF , no testing on partner   di di twins ; TWIN B 1%   tx of CARE from [facility] OB CARE TO [facility] SECONDARY TO IUGR of twin B with elevated dopplers   intermittent care ( travel from [facility] )   ALERT NICU   Lisandra Ayala RN 9/30/2024    Maternal/Fetal Alert  AMA , DI DI twins

## 2024-11-27 NOTE — OB PROVIDER DELIVERY SUMMARY - NSSELHIDDEN_OBGYN_ALL_OB_FT
[NS_DeliveryAttending1_OBGYN_ALL_OB_FT:[id number]],[NS_DeliveryAssist1_OBGYN_ALL_OB_FT:[id number]],[NS_DeliveryRN_OBGYN_ALL_OB_FT:[id number]],[NS_DeliveryAssist2_OBGYN_ALL_OB_FT:[id number]],[NS_DeliveryAttending2_OBGYN_ALL_OB_FT:[id number]]

## 2024-11-27 NOTE — OB RN PATIENT PROFILE - PRO PRENATAL LABS ORI SOURCE RUBELLA
Detail Level: Generalized Detail Level: Zone Detail Level: Detailed hard copy, drawn during this pregnancy

## 2024-11-27 NOTE — OB PROVIDER DELIVERY SUMMARY - NSMATERNALFETALCONCERNS_OBGYN_ALL_OB_FT
Maternal/Fetal Alert  AMA , carrier CF , no testing on partner   di di twins ; TWIN B 1%   tx of CARE from [facility] OB CARE TO [facility] SECONDARY TO IUGR of twin B with elevated dopplers   intermittent care ( travel from [facility] )   ALERT NICU   Lisandra Ayala RN 9/30/2024    Maternal/Fetal Alert  AMA , DI DI twins

## 2024-11-27 NOTE — OB PROVIDER H&P - NS_OBGYNHISTORY_OBGYN_ALL_OB_FT
2012, , full term, 6#7  2020, , full term, 6#13    Gyn Hx: Fibroid uterus, Denies ovarian cysts, abnormal pap smears    Posterior 3x2x2  Anterior 3x2x2  Posterior 2x1x1

## 2024-11-27 NOTE — OB RN DELIVERY SUMMARY - NSSELHIDDEN_OBGYN_ALL_OB_FT
[NS_DeliveryAttending1_OBGYN_ALL_OB_FT:[id number]],[NS_DeliveryAssist1_OBGYN_ALL_OB_FT:[id number]],[NS_DeliveryRN_OBGYN_ALL_OB_FT:[id number]],[NS_DeliveryAssist2_OBGYN_ALL_OB_FT:[id number]],[NS_DeliveryAttending2_OBGYN_ALL_OB_FT:[id number]]

## 2024-11-27 NOTE — OB RN DELIVERY SUMMARY - BABY B: WEIGHT (GM), DELIVERY
Department of Anesthesiology  Postprocedure Note    Patient: Oumou Josue  MRN: [id number]  YOB: 1990  Date of evaluation: 7/2/2020  Time:  3:47 PM     Procedure Summary     Date:  07/02/20 Room / Location:  [address] [place] [address]    Anesthesia Start:  4606 Anesthesia Stop:  1448    Procedures:       LAPAROSCOPIC CHOLECYSTECTOMY WITH CHOLANGIOGRAM (N/A Abdomen)      OPEN  UMBILICAL HERNIA REPAIR (N/A Abdomen) Diagnosis:       Acute calculous cholecystitis      Umbilical hernia without obstruction or gangrene      (ACUTE CALCULOUS CHOLECYSTITIS, UMBILICAL HERNIA WITHOUT OBSTRUCTION OR GANGRENE)    Surgeon:  Efe Finch MD Responsible Provider:  Kaylee Goodman MD    Anesthesia Type:  general ASA Status:  2          Anesthesia Type: general    Antelmo Phase I: Antelmo Score: 9    Antelmo Phase II:      Last vitals: Reviewed and per EMR flowsheets.        Anesthesia Post Evaluation    Patient location during evaluation: bedside  Patient participation: complete - patient participated  Level of consciousness: awake  Pain score: 2  Airway patency: patent  Nausea & Vomiting: no nausea and no vomiting  Complications: no  Cardiovascular status: blood pressure returned to baseline  Respiratory status: acceptable  Hydration status: euvolemic 9448

## 2024-11-27 NOTE — OB PROVIDER DELIVERY SUMMARY - OB VTE ASSESSMENT
Telephone Encounter by Sherman Diego [facility] at 11/20/18 08:28 AM     Author:  Sherman Diego [facility] [address] Service:  (none) Author Type:  Certified Medical Assistant     Filed:  11/20/18 08:28 AM Encounter Date:  11/19/2018 Status:  Signed     :  Sherman Diego [facility] (Certified Medical Assistant)       From: Kali Davey  To: Talon SALCEDO MD  Sent: 11/19/2018  9:06 PM CST  Subject: Other    I'm not feeling well. To much stress I'm snapping and everyone. I keep   getting headaches in the back of my head. My body is hurting. Starting   to get depressed       Revision History        Date/Time User Provider Type Action    > 11/20/18 08:28 AM Sherman Diego [facility] Certified Medical Assistant Sign    Attribution information within the note text is not available. <<--- Click to launch

## 2024-11-27 NOTE — OB PROVIDER H&P - NSMATERNALFETALCONCERNS_OBGYN_ALL_OB_FT
Maternal/Fetal Alert  AMA , carrier CF , no testing on partner   di di twins ; TWIN B 1%   tx of CARE from [facility] OB CARE TO [facility] SECONDARY TO IUGR of twin B with elevated dopplers   intermittent care ( travel from [facility] )   ALERT NICU   Lisandra Ayala RN 9/30/2024    Maternal/Fetal Alert  AMA , DI DI twins

## 2024-11-27 NOTE — OB PROVIDER H&P - ASSESSMENT
39yo  michelle NICHOLSON @ 36.2w  Dx: Scheduled primary  section and tubal ligation  Fetus A IUGR 7%, AC10%, elevated UAD  Fetus B IUGR <1%, AC<1%, elevated UAD  gHTN vs. 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 All questions answered, all appropriate hospital consents were signed.     - Discussed with  ***    Anna Sigala, PAC   39yo  michelle NICHOLSON @ 36.2w  Dx: Scheduled primary  section and tubal ligation  Fetus A IUGR 7%, AC10%, elevated UAD  Fetus B IUGR <1%, AC<1%, elevated UAD  gHTN vs. 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 Barros  - Plan to move to OR on time schedule    Risks, benefits, alternative, and possible complications have been discussed in detail by Dr. Barros with the patient in her native language, Pre-admission, admission, post admission procedures and expectations were discussed in detail. All questions answered, all appropriate hospital consents were signed.     - Discussed with Dr. Papo Sigala, PAC

## 2024-11-28 LAB
BASOPHILS # BLD AUTO: 0.02 K/UL — SIGNIFICANT CHANGE UP (ref 0–0.2)
BASOPHILS NFR BLD AUTO: 0.1 % — SIGNIFICANT CHANGE UP (ref 0–2)
EOSINOPHIL # BLD AUTO: 0 K/UL — SIGNIFICANT CHANGE UP (ref 0–0.5)
EOSINOPHIL NFR BLD AUTO: 0 % — SIGNIFICANT CHANGE UP (ref 0–6)
HCT VFR BLD CALC: 29.9 % — LOW (ref 34.5–45)
HGB BLD-MCNC: 9.9 G/DL — LOW (ref 11.5–15.5)
IANC: 12.72 K/UL — HIGH (ref 1.8–7.4)
IMM GRANULOCYTES NFR BLD AUTO: 0.7 % — SIGNIFICANT CHANGE UP (ref 0–0.9)
LYMPHOCYTES # BLD AUTO: 1.68 K/UL — SIGNIFICANT CHANGE UP (ref 1–3.3)
LYMPHOCYTES # BLD AUTO: 11.1 % — LOW (ref 13–44)
MCHC RBC-ENTMCNC: 30.3 PG — SIGNIFICANT CHANGE UP (ref 27–34)
MCHC RBC-ENTMCNC: 33.1 G/DL — SIGNIFICANT CHANGE UP (ref 32–36)
MCV RBC AUTO: 91.4 FL — SIGNIFICANT CHANGE UP (ref 80–100)
MONOCYTES # BLD AUTO: 0.56 K/UL — SIGNIFICANT CHANGE UP (ref 0–0.9)
MONOCYTES NFR BLD AUTO: 3.7 % — SIGNIFICANT CHANGE UP (ref 2–14)
NEUTROPHILS # BLD AUTO: 12.72 K/UL — HIGH (ref 1.8–7.4)
NEUTROPHILS NFR BLD AUTO: 84.4 % — HIGH (ref 43–77)
NRBC # BLD: 0 /100 WBCS — SIGNIFICANT CHANGE UP (ref 0–0)
NRBC # FLD: 0 K/UL — SIGNIFICANT CHANGE UP (ref 0–0)
PLATELET # BLD AUTO: 180 K/UL — SIGNIFICANT CHANGE UP (ref 150–400)
RBC # BLD: 3.27 M/UL — LOW (ref 3.8–5.2)
RBC # FLD: 13.9 % — SIGNIFICANT CHANGE UP (ref 10.3–14.5)
WBC # BLD: 15.09 K/UL — HIGH (ref 3.8–10.5)
WBC # FLD AUTO: 15.09 K/UL — HIGH (ref 3.8–10.5)

## 2024-11-28 RX ORDER — HEPARIN SODIUM,PORCINE 1000/ML
10000 VIAL (ML) INJECTION EVERY 12 HOURS
Refills: 0 | Status: DISCONTINUED | OUTPATIENT
Start: 2024-11-28 | End: 2024-12-01

## 2024-11-28 RX ORDER — IBUPROFEN 200 MG
600 TABLET ORAL EVERY 6 HOURS
Refills: 0 | Status: DISCONTINUED | OUTPATIENT
Start: 2024-11-28 | End: 2024-12-01

## 2024-11-28 RX ADMIN — ACETAMINOPHEN 500MG 975 MILLIGRAM(S): 500 TABLET, COATED ORAL at 09:45

## 2024-11-28 RX ADMIN — KETOROLAC TROMETHAMINE 30 MILLIGRAM(S): 30 INJECTION INTRAMUSCULAR; INTRAVENOUS at 00:00

## 2024-11-28 RX ADMIN — ACETAMINOPHEN 500MG 975 MILLIGRAM(S): 500 TABLET, COATED ORAL at 14:30

## 2024-11-28 RX ADMIN — Medication 600 MILLIGRAM(S): at 18:00

## 2024-11-28 RX ADMIN — Medication 10000 UNIT(S): at 08:44

## 2024-11-28 RX ADMIN — Medication 600 MILLIGRAM(S): at 23:49

## 2024-11-28 RX ADMIN — ACETAMINOPHEN 500MG 975 MILLIGRAM(S): 500 TABLET, COATED ORAL at 08:42

## 2024-11-28 RX ADMIN — KETOROLAC TROMETHAMINE 30 MILLIGRAM(S): 30 INJECTION INTRAMUSCULAR; INTRAVENOUS at 06:45

## 2024-11-28 RX ADMIN — KETOROLAC TROMETHAMINE 30 MILLIGRAM(S): 30 INJECTION INTRAMUSCULAR; INTRAVENOUS at 06:00

## 2024-11-28 RX ADMIN — Medication 600 MILLIGRAM(S): at 17:31

## 2024-11-28 RX ADMIN — KETOROLAC TROMETHAMINE 30 MILLIGRAM(S): 30 INJECTION INTRAMUSCULAR; INTRAVENOUS at 11:56

## 2024-11-28 RX ADMIN — ACETAMINOPHEN 500MG 975 MILLIGRAM(S): 500 TABLET, COATED ORAL at 15:21

## 2024-11-28 RX ADMIN — ACETAMINOPHEN 500MG 975 MILLIGRAM(S): 500 TABLET, COATED ORAL at 20:29

## 2024-11-28 RX ADMIN — KETOROLAC TROMETHAMINE 30 MILLIGRAM(S): 30 INJECTION INTRAMUSCULAR; INTRAVENOUS at 00:15

## 2024-11-28 RX ADMIN — Medication 10000 UNIT(S): at 20:34

## 2024-11-28 RX ADMIN — ACETAMINOPHEN 500MG 975 MILLIGRAM(S): 500 TABLET, COATED ORAL at 21:00

## 2024-11-28 RX ADMIN — KETOROLAC TROMETHAMINE 30 MILLIGRAM(S): 30 INJECTION INTRAMUSCULAR; INTRAVENOUS at 12:45

## 2024-11-28 NOTE — PROGRESS NOTE ADULT - SUBJECTIVE AND OBJECTIVE BOX
R1 Progress Note    Patient seen and examined at bedside, no acute overnight events. No acute complaints, pain well controlled. Patient is ambulating and tolerating regular diet. Has not yet passed flatus. Leyva is still in place. Denies fever, chills, CP, SOB, N/V, HA, blurred vision.     Vital Signs Last 24 Hours  T(C): 36.8 (11-28-24 @ 06:11), Max: 36.9 (11-27-24 @ 13:25)  HR: 67 (11-28-24 @ 06:11) (55 - 67)  BP: 127/63 (11-28-24 @ 06:11) (89/64 - 148/83)  RR: 18 (11-28-24 @ 06:11) (12 - 20)  SpO2: 96% (11-28-24 @ 06:11) (96% - 100%)    I&O's Summary    27 Nov 2024 07:01  -  28 Nov 2024 06:17  --------------------------------------------------------  IN: 3010 mL / OUT: 2006 mL / NET: 1004 mL        Physical Exam:  General: NAD  Abdomen: Soft, appropriately-tender, mildly-distended, fundus firm  Incision: Pfannenstiel incision CDI, subcuticular suture closure  Pelvic: Lochia wnl    Labs:    Blood Type: A Negative  Antibody Screen: Negative  RPR: Negative               11.6   16.08 )-----------( 198      ( 11-27 @ 22:15 )             34.4                11.7   8.56  )-----------( 209      ( 11-27 @ 13:00 )             35.5                11.3   9.06  )-----------( 209      ( 11-21 @ 09:58 )             34.5         MEDICATIONS  (STANDING):  acetaminophen     Tablet .. 975 milliGRAM(s) Oral <User Schedule>  diphtheria/tetanus/pertussis (acellular) Vaccine (Adacel) 0.5 milliLiter(s) IntraMuscular once  heparin   Injectable 92707 Unit(s) SubCutaneous every 12 hours  ibuprofen  Tablet. 600 milliGRAM(s) Oral every 6 hours  ketorolac   Injectable 30 milliGRAM(s) IV Push every 6 hours  lactated ringers. 1000 milliLiter(s) (200 mL/Hr) IV Continuous <Continuous>  lactated ringers. 1000 milliLiter(s) (125 mL/Hr) IV Continuous <Continuous>  oxytocin Infusion 42 milliUNIT(s)/Min (42 mL/Hr) IV Continuous <Continuous>    MEDICATIONS  (PRN):  diphenhydrAMINE 25 milliGRAM(s) Oral every 6 hours PRN Pruritus  lanolin Ointment 1 Application(s) Topical every 6 hours PRN Sore Nipples  magnesium hydroxide Suspension 30 milliLiter(s) Oral two times a day PRN Constipation  oxyCODONE    IR 5 milliGRAM(s) Oral every 3 hours PRN Moderate to Severe Pain (4-10)  oxyCODONE    IR 5 milliGRAM(s) Oral once PRN Moderate to Severe Pain (4-10)  simethicone 80 milliGRAM(s) Chew every 4 hours PRN Gas   R1 Progress Note    Patient seen and examined at bedside, no acute overnight events. No acute complaints, pain well controlled. Patient is ambulating and tolerating regular diet. Has not yet passed flatus. Patient is not yet voiding spontaneously. Denies fever, chills, CP, SOB, N/V, HA, blurred vision.     Vital Signs Last 24 Hours  T(C): 36.8 (11-28-24 @ 06:11), Max: 36.9 (11-27-24 @ 13:25)  HR: 67 (11-28-24 @ 06:11) (55 - 67)  BP: 127/63 (11-28-24 @ 06:11) (89/64 - 148/83)  RR: 18 (11-28-24 @ 06:11) (12 - 20)  SpO2: 96% (11-28-24 @ 06:11) (96% - 100%)    I&O's Summary    27 Nov 2024 07:01  -  28 Nov 2024 06:17  --------------------------------------------------------  IN: 3010 mL / OUT: 2006 mL / NET: 1004 mL        Physical Exam:  General: NAD  Abdomen: Soft, appropriately-tender, mildly-distended, fundus firm  Incision: Pfannenstiel incision CDI, subcuticular suture closure  Pelvic: Lochia wnl    Labs:    Blood Type: A Negative  Antibody Screen: Negative  RPR: Negative               11.6   16.08 )-----------( 198      ( 11-27 @ 22:15 )             34.4                11.7   8.56  )-----------( 209      ( 11-27 @ 13:00 )             35.5                11.3   9.06  )-----------( 209      ( 11-21 @ 09:58 )             34.5         MEDICATIONS  (STANDING):  acetaminophen     Tablet .. 975 milliGRAM(s) Oral <User Schedule>  diphtheria/tetanus/pertussis (acellular) Vaccine (Adacel) 0.5 milliLiter(s) IntraMuscular once  heparin   Injectable 17953 Unit(s) SubCutaneous every 12 hours  ibuprofen  Tablet. 600 milliGRAM(s) Oral every 6 hours  ketorolac   Injectable 30 milliGRAM(s) IV Push every 6 hours  lactated ringers. 1000 milliLiter(s) (200 mL/Hr) IV Continuous <Continuous>  lactated ringers. 1000 milliLiter(s) (125 mL/Hr) IV Continuous <Continuous>  oxytocin Infusion 42 milliUNIT(s)/Min (42 mL/Hr) IV Continuous <Continuous>    MEDICATIONS  (PRN):  diphenhydrAMINE 25 milliGRAM(s) Oral every 6 hours PRN Pruritus  lanolin Ointment 1 Application(s) Topical every 6 hours PRN Sore Nipples  magnesium hydroxide Suspension 30 milliLiter(s) Oral two times a day PRN Constipation  oxyCODONE    IR 5 milliGRAM(s) Oral every 3 hours PRN Moderate to Severe Pain (4-10)  oxyCODONE    IR 5 milliGRAM(s) Oral once PRN Moderate to Severe Pain (4-10)  simethicone 80 milliGRAM(s) Chew every 4 hours PRN Gas

## 2024-11-28 NOTE — PROGRESS NOTE ADULT - SUBJECTIVE AND OBJECTIVE BOX
POST OP DAY  1  s/p   SECTION    SUBJECTIVE:    PAIN SCALE SCORE: [x] Refer to charted pain scores    THERAPY:  [  ] Spinal morphine   [  ] Epidural morphine   [  ] IV PCA Hydromorphone 1 mg/ml    OBJECTIVE:     SEDATION SCORE:	  [ x ] Alert	    [  ] Drowsy        [  ] Arousable	[  ] Asleep	[  ] Unresponsive    Side Effects:	  [ x ] None	     [  ] Nausea        [  ] Pruritus        [  ] Weakness   [  ] Numbness        ASSESSMENT/ PLAN   [   ] Discontinue         [  ] Continue    [ x ] Change to prn Analgesics as per primary service.    DOCUMENTATION & VERIFICATION OF CURRENT MEDS [ x ] Done    COMMENTS: No Headache.

## 2024-11-28 NOTE — PROGRESS NOTE ADULT - ASSESSMENT
NOTE IS INCOMPLETE  39y/o POD#1 from pLTCS +BS for di-di twins. Patient is currently stable and beginning to meet postpartum milestones.     #Postpartum  - Continue with po analgesia  - Increase ambulation  - Continue regular diet  - AM CBC   - Incision site c/d/i    Kari Sam PGY1 39y/o POD#1 from pLTCS +BS for di-di twins. Patient is currently stable and beginning to meet postpartum milestones.     #Postpartum  - Continue with po analgesia  - Increase ambulation  - Continue regular diet  - AM CBC   - DTV this afternoon   - Incision site c/d/i    Kari Sam PGY1

## 2024-11-29 ENCOUNTER — NON-APPOINTMENT (OUTPATIENT)
Age: 39
End: 2024-11-29

## 2024-11-29 LAB — KLEIHAUER-BETKE CALCULATION: 0 % — SIGNIFICANT CHANGE UP (ref 0–0.2)

## 2024-11-29 RX ORDER — GLUCOSAMINE SULFATE DIPOT CHLR 500 MG
1 CAPSULE ORAL
Refills: 0 | DISCHARGE

## 2024-11-29 RX ORDER — IBUPROFEN 200 MG
1 TABLET ORAL
Qty: 0 | Refills: 0 | DISCHARGE
Start: 2024-11-29

## 2024-11-29 RX ORDER — VALACYCLOVIR HYDROCHLORIDE 1 G/1
1 TABLET, FILM COATED ORAL
Refills: 0 | DISCHARGE

## 2024-11-29 RX ORDER — FERROUS SULFATE 325(65) MG
1 TABLET ORAL
Refills: 0 | DISCHARGE

## 2024-11-29 RX ORDER — ACETAMINOPHEN 500MG 500 MG/1
3 TABLET, COATED ORAL
Qty: 0 | Refills: 0 | DISCHARGE
Start: 2024-11-29

## 2024-11-29 RX ORDER — .BETA.-CAROTENE, SODIUM ACETATE, ASCORBIC ACID, CHOLECALCIFEROL, .ALPHA.-TOCOPHEROL ACETATE, DL-, THIAMINE MONONITRATE, RIBOFLAVIN, NIACINAMIDE, PYRIDOXINE HYDROCHLORIDE, FOLIC ACID, CYANOCOBALAMIN, CALCIUM CARBONATE, FERROUS FUMARATE, ZINC OXIDE AND CUPRIC OXIDE 2000; 2000; 120; 400; 22; 1.84; 3; 20; 10; 1; 12; 200; 27; 25; 2 [IU]/1; [IU]/1; MG/1; [IU]/1; MG/1; MG/1; MG/1; MG/1; MG/1; MG/1; UG/1; MG/1; MG/1; MG/1; MG/1
1 TABLET ORAL
Refills: 0 | DISCHARGE

## 2024-11-29 RX ADMIN — Medication 600 MILLIGRAM(S): at 05:27

## 2024-11-29 RX ADMIN — ACETAMINOPHEN 500MG 975 MILLIGRAM(S): 500 TABLET, COATED ORAL at 15:22

## 2024-11-29 RX ADMIN — ACETAMINOPHEN 500MG 975 MILLIGRAM(S): 500 TABLET, COATED ORAL at 09:02

## 2024-11-29 RX ADMIN — ACETAMINOPHEN 500MG 975 MILLIGRAM(S): 500 TABLET, COATED ORAL at 20:08

## 2024-11-29 RX ADMIN — Medication 10000 UNIT(S): at 20:08

## 2024-11-29 RX ADMIN — Medication 600 MILLIGRAM(S): at 12:48

## 2024-11-29 RX ADMIN — Medication 600 MILLIGRAM(S): at 17:51

## 2024-11-29 RX ADMIN — Medication 10000 UNIT(S): at 11:38

## 2024-11-29 RX ADMIN — Medication 600 MILLIGRAM(S): at 18:21

## 2024-11-29 RX ADMIN — Medication 30 MILLILITER(S): at 23:43

## 2024-11-29 RX ADMIN — ACETAMINOPHEN 500MG 975 MILLIGRAM(S): 500 TABLET, COATED ORAL at 20:38

## 2024-11-29 RX ADMIN — ACETAMINOPHEN 500MG 975 MILLIGRAM(S): 500 TABLET, COATED ORAL at 15:52

## 2024-11-29 RX ADMIN — Medication 600 MILLIGRAM(S): at 23:39

## 2024-11-29 RX ADMIN — Medication 600 MILLIGRAM(S): at 12:18

## 2024-11-29 RX ADMIN — ACETAMINOPHEN 500MG 975 MILLIGRAM(S): 500 TABLET, COATED ORAL at 08:32

## 2024-11-29 RX ADMIN — Medication 600 MILLIGRAM(S): at 00:19

## 2024-11-29 NOTE — PROGRESS NOTE ADULT - SUBJECTIVE AND OBJECTIVE BOX
R1 Progress Note    Patient seen and examined at bedside, no acute overnight events. No acute complaints, pain well controlled. Patient is ambulating and tolerating regular diet. Has not yet passed flatus. Patient voiding spontaneously. Denies fever, chills, CP, SOB, N/V, HA, blurred vision.     Vital Signs Last 24 Hours  T(C): 36.8 (11-29-24 @ 02:00), Max: 37.3 (11-28-24 @ 18:39)  HR: 71 (11-29-24 @ 02:00) (71 - 83)  BP: 121/67 (11-29-24 @ 02:00) (102/55 - 131/72)  RR: 18 (11-29-24 @ 02:00) (18 - 18)  SpO2: 97% (11-29-24 @ 02:00) (95% - 98%)    I&O's Summary    27 Nov 2024 07:01  -  28 Nov 2024 07:00  --------------------------------------------------------  IN: 3010 mL / OUT: 2006 mL / NET: 1004 mL    28 Nov 2024 07:01  -  29 Nov 2024 06:20  --------------------------------------------------------  IN: 0 mL / OUT: 1050 mL / NET: -1050 mL        Physical Exam:  General: NAD  Abdomen: Soft, non-tender, non-distended, fundus firm  Incision: Pfannenstiel incision CDI, subcuticular suture closure  Pelvic: Lochia wnl    Labs:    Blood Type: A Negative  Antibody Screen: Negative  RPR: Negative               9.9    15.09 )-----------( 180      ( 11-28 @ 05:10 )             29.9                11.6   16.08 )-----------( 198      ( 11-27 @ 22:15 )             34.4                11.7   8.56  )-----------( 209      ( 11-27 @ 13:00 )             35.5         MEDICATIONS  (STANDING):  acetaminophen     Tablet .. 975 milliGRAM(s) Oral <User Schedule>  diphtheria/tetanus/pertussis (acellular) Vaccine (Adacel) 0.5 milliLiter(s) IntraMuscular once  heparin   Injectable 82805 Unit(s) SubCutaneous every 12 hours  ibuprofen  Tablet. 600 milliGRAM(s) Oral every 6 hours  lactated ringers. 1000 milliLiter(s) (125 mL/Hr) IV Continuous <Continuous>    MEDICATIONS  (PRN):  diphenhydrAMINE 25 milliGRAM(s) Oral every 6 hours PRN Pruritus  lanolin Ointment 1 Application(s) Topical every 6 hours PRN Sore Nipples  magnesium hydroxide Suspension 30 milliLiter(s) Oral two times a day PRN Constipation  oxyCODONE    IR 5 milliGRAM(s) Oral every 3 hours PRN Moderate to Severe Pain (4-10)  oxyCODONE    IR 5 milliGRAM(s) Oral once PRN Moderate to Severe Pain (4-10)  simethicone 80 milliGRAM(s) Chew every 4 hours PRN Gas   R1 Progress Note    Patient seen and examined at bedside, no acute overnight events. No acute complaints, pain well controlled. Patient is ambulating and tolerating regular diet. Pt is passing flatus and voiding spontaneously. Denies fever, chills, CP, SOB, N/V, HA, blurred vision.     Vital Signs Last 24 Hours  T(C): 36.8 (11-29-24 @ 02:00), Max: 37.3 (11-28-24 @ 18:39)  HR: 71 (11-29-24 @ 02:00) (71 - 83)  BP: 121/67 (11-29-24 @ 02:00) (102/55 - 131/72)  RR: 18 (11-29-24 @ 02:00) (18 - 18)  SpO2: 97% (11-29-24 @ 02:00) (95% - 98%)    I&O's Summary    27 Nov 2024 07:01  -  28 Nov 2024 07:00  --------------------------------------------------------  IN: 3010 mL / OUT: 2006 mL / NET: 1004 mL    28 Nov 2024 07:01  -  29 Nov 2024 06:20  --------------------------------------------------------  IN: 0 mL / OUT: 1050 mL / NET: -1050 mL        Physical Exam:  General: NAD  Abdomen: Soft, appropriately-tender, mildly-distended, fundus firm  Incision: Pfannenstiel incision CDI, subcuticular suture closure  Pelvic: Lochia wnl    Labs:    Blood Type: A Negative  Antibody Screen: Negative  RPR: Negative               9.9    15.09 )-----------( 180      ( 11-28 @ 05:10 )             29.9                11.6   16.08 )-----------( 198      ( 11-27 @ 22:15 )             34.4                11.7   8.56  )-----------( 209      ( 11-27 @ 13:00 )             35.5         MEDICATIONS  (STANDING):  acetaminophen     Tablet .. 975 milliGRAM(s) Oral <User Schedule>  diphtheria/tetanus/pertussis (acellular) Vaccine (Adacel) 0.5 milliLiter(s) IntraMuscular once  heparin   Injectable 62362 Unit(s) SubCutaneous every 12 hours  ibuprofen  Tablet. 600 milliGRAM(s) Oral every 6 hours  lactated ringers. 1000 milliLiter(s) (125 mL/Hr) IV Continuous <Continuous>    MEDICATIONS  (PRN):  diphenhydrAMINE 25 milliGRAM(s) Oral every 6 hours PRN Pruritus  lanolin Ointment 1 Application(s) Topical every 6 hours PRN Sore Nipples  magnesium hydroxide Suspension 30 milliLiter(s) Oral two times a day PRN Constipation  oxyCODONE    IR 5 milliGRAM(s) Oral every 3 hours PRN Moderate to Severe Pain (4-10)  oxyCODONE    IR 5 milliGRAM(s) Oral once PRN Moderate to Severe Pain (4-10)  simethicone 80 milliGRAM(s) Chew every 4 hours PRN Gas

## 2024-11-29 NOTE — DISCHARGE NOTE OB - PLAN OF CARE
After discharge, please stay on pelvic rest for 6 weeks, meaning no sexual intercourse, no tampons and no douching.  No driving for 2 weeks as women can loose a lot of blood during delivery and there is a possibility of being lightheaded/fainting.  No lifting objects heavier than a gallon of milk for two weeks.  Expect to have vaginal bleeding/spotting for up to six weeks.  The bleeding should get lighter and more white/light brown with time.  For bleeding soaking more than a pad an hour or passing clots greater than the size of your fist, come in to the emergency department.    Follow up in clinic in 2 weeks for incision check.  Call clinic for noticeable increase in redness or swelling at incision, discharge from incision, or opening of skin at incision site. You experienced increased bleeding at the time of delivery. Your hemoglobin/hematocrit (blood levels) have remained stable. Continue to take iron/vitamin C at home. If you had high blood pressure during your pregnancy, during labor, or after delivery, please follow up with your OB/Gyn in 1-3 days for a BP check.    Continue to monitor your blood pressures three times daily at home (before breakfast, lunch, and dinner).  If any blood pressures are GREATER than 160/110, or if you experience changes in your vision, headache not improved with tylenol, persistent nausea, vomiting and/or right upper abdominal pain, present to ED for further evaluation.    If you are taking blood pressure medication, take your blood pressure before taking your medication. If LESS than 110 systolic (top number) and/or LESS than 60 diastolic (bottom number), do not take your medication.

## 2024-11-29 NOTE — DISCHARGE NOTE OB - PROVIDER TOKENS
FREE:[LAST:[ANASTACIO KIM],PHONE:[phone]],FAX:[(   )    -],ADDRESS:[[address] [place] [address]]] FREE:[LAST:[ANASTACIO KIM],PHONE:[phone]],FAX:[(   )    -],ADDRESS:[[address] [place] [address]],FOLLOWUP:[1-3 days]]

## 2024-11-29 NOTE — DISCHARGE NOTE OB - PATIENT PORTAL LINK FT
You can access the FollowMyHealth Patient Portal offered by [facility] by registering at the following website: [email or web address]. By joining [employer]’s FollowMyHealth portal, you will also be able to view your health information using other applications (apps) compatible with our system.

## 2024-11-29 NOTE — DISCHARGE NOTE OB - MEDICATION SUMMARY - MEDICATIONS TO STOP TAKING
I will STOP taking the medications listed below when I get home from the hospital:    valACYclovir 1 g oral tablet  -- 1 tab(s) by mouth once a day

## 2024-11-29 NOTE — DISCHARGE NOTE OB - CARE PLAN
Principal Discharge DX:	 delivery delivered  Assessment and plan of treatment:	After discharge, please stay on pelvic rest for 6 weeks, meaning no sexual intercourse, no tampons and no douching.  No driving for 2 weeks as women can loose a lot of blood during delivery and there is a possibility of being lightheaded/fainting.  No lifting objects heavier than a gallon of milk for two weeks.  Expect to have vaginal bleeding/spotting for up to six weeks.  The bleeding should get lighter and more white/light brown with time.  For bleeding soaking more than a pad an hour or passing clots greater than the size of your fist, come in to the emergency department.    Follow up in clinic in 2 weeks for incision check.  Call clinic for noticeable increase in redness or swelling at incision, discharge from incision, or opening of skin at incision site.   1 Principal Discharge DX:	 delivery delivered  Assessment and plan of treatment:	After discharge, please stay on pelvic rest for 6 weeks, meaning no sexual intercourse, no tampons and no douching.  No driving for 2 weeks as women can loose a lot of blood during delivery and there is a possibility of being lightheaded/fainting.  No lifting objects heavier than a gallon of milk for two weeks.  Expect to have vaginal bleeding/spotting for up to six weeks.  The bleeding should get lighter and more white/light brown with time.  For bleeding soaking more than a pad an hour or passing clots greater than the size of your fist, come in to the emergency department.    Follow up in clinic in 2 weeks for incision check.  Call clinic for noticeable increase in redness or swelling at incision, discharge from incision, or opening of skin at incision site.  Secondary Diagnosis:	Gestational hypertension  Assessment and plan of treatment:	If you had high blood pressure during your pregnancy, during labor, or after delivery, please follow up with your OB/Gyn in 1-3 days for a BP check.    Continue to monitor your blood pressures three times daily at home (before breakfast, lunch, and dinner).  If any blood pressures are GREATER than 160/110, or if you experience changes in your vision, headache not improved with tylenol, persistent nausea, vomiting and/or right upper abdominal pain, present to ED for further evaluation.    If you are taking blood pressure medication, take your blood pressure before taking your medication. If LESS than 110 systolic (top number) and/or LESS than 60 diastolic (bottom number), do not take your medication.  Secondary Diagnosis:	Postpartum hemorrhage  Assessment and plan of treatment:	You experienced increased bleeding at the time of delivery. Your hemoglobin/hematocrit (blood levels) have remained stable. Continue to take iron/vitamin C at home.

## 2024-11-29 NOTE — DISCHARGE NOTE OB - FINANCIAL ASSISTANCE
[facility] provides services at a reduced cost to those who are determined to be eligible through [facility]’s financial assistance program. Information regarding [facility]’s financial assistance program can be found by going to [email or web address] or by calling [phone].

## 2024-11-29 NOTE — DISCHARGE NOTE OB - CARE PROVIDER_API CALL
ANASTACIO KIM,   [address] [address] [place], [place] [address]  Oncology [place] [address]  Phone: [phone]  Fax: (   )    -  Follow Up Time:    ANASTACIO KIM,   [address] [address] [place] [address]  Oncology [place] [address]  Phone: [phone]  Fax: (   )    -  Follow Up Time: 1-3 days

## 2024-11-29 NOTE — PROGRESS NOTE ADULT - ASSESSMENT
NOTE IS INCOMPLETE  39y/o POD#2 from pLTCS +BS for di-di twins. Patient is currently stable and meeting postpartum milestones.     #Postpartum  - Continue with po analgesia  - Increase ambulation  - Continue regular diet  - Incision site c/d/i    Kari Sam PGY1   39y/o POD#2 from pLTCS +BS for di-di twins. Patient is currently stable and meeting postpartum milestones.     #Postpartum  - Continue with po analgesia  - Increase ambulation  - Continue regular diet  - Incision site c/d/i    Kari Sam PGY1   39y/o POD#2 from pLTCS +BS for di-di twins. Patient is currently stable and meeting postpartum milestones.     #gHTN  - cont to monitor pressures and symptoms    #Postpartum  - Continue with po analgesia  - Increase ambulation  - Continue regular diet  - Incision site c/d/i    Kari Sam PGY1

## 2024-11-29 NOTE — DISCHARGE NOTE OB - HOSPITAL COURSE
Patient had uncomplicated low transverse  section. During postpartum course patient's vitals were stable, vaginal bleeding appropriate, and pain well controlled.  Post-operation Day #1 hematocrit was appropriate. On the day of discharge patient was ambulating, with pain controlled with oral medications, having adequate oral intake, and voiding freely.  Discharge instructions and precautions were given.  Will return to clinic in 2 weeks for incision check.   Patient had uncomplicated low transverse  section c/b PPH and gestational hypertension. During postpartum course patient's vitals were stable, vaginal bleeding appropriate, and pain well controlled.  Post-operation Day #1 hematocrit was appropriate. On the day of discharge patient was ambulating, with pain controlled with oral medications, having adequate oral intake, and voiding freely. You were started on Procardia 30mg daily to lower your blood pressure. Discharge instructions and precautions were given.  Will return to clinic in 3 days for a blood pressure check and 2 weeks for incision check.

## 2024-11-29 NOTE — DISCHARGE NOTE OB - MEDICATION SUMMARY - MEDICATIONS TO TAKE
I will START or STAY ON the medications listed below when I get home from the hospital:    ibuprofen 600 mg oral tablet  -- 1 tab(s) by mouth every 6 hours  -- Indication: For pain    acetaminophen 325 mg oral tablet  -- 3 tab(s) by mouth every 6 hours  -- Indication: For pain   I will START or STAY ON the medications listed below when I get home from the hospital:    electronic blood pressure cuff  -- If you had high blood pressure during your pregnancy, during labor, or after delivery, please follow up with your OB/Gyn in 1-3 days for a BP check.    Continue to monitor your blood pressures three times daily at home (before breakfast, lunch, and dinner).  If any blood pressures are GREATER than 160/110, or if you experience changes in your vision, headache not improved with tylenol, persistent nausea, vomiting and/or right upper abdominal pain, present to ED for further evaluation.    If you are taking blood pressure medication, take your blood pressure before taking your medication. If LESS than 110 systolic (top number) and/or LESS than 60 diastolic (bottom number), do not take your medication.  -- Indication: For blood pressure    ibuprofen 600 mg oral tablet  -- 1 tab(s) by mouth every 6 hours  -- Indication: For pain    acetaminophen 325 mg oral tablet  -- 3 tab(s) by mouth every 6 hours  -- Indication: For pain    NIFEdipine 30 mg oral tablet, extended release  -- 1 tab(s) by mouth every 24 hours If you had high blood pressure during your pregnancy, during labor, or after delivery, please follow up with your OB/Gyn in 1-3 days for a BP check.    Continue to monitor your blood pressures three times daily at home (before breakfast, lunch, and dinner).  If any blood pressures are GREATER than 160/110, or if you experience changes in your vision, headache not improved with tylenol, persistent nausea, vomiting and/or right upper abdominal pain, present to ED for further evaluation.    If you are taking blood pressure medication, take your blood pressure before taking your medication. If LESS than 110 systolic (top number) and/or LESS than 60 diastolic (bottom number), do not take your medication.  -- Indication: For blood pressure

## 2024-11-29 NOTE — DISCHARGE NOTE OB - HOME CARE AGENCY TYPE OF SERVICE:
Dear Emily Mcgee    After reviewing your responses, I've been able to diagnose you with a urinary tract infection, which is a common infection of the bladder with bacteria.  This is not a sexually transmitted infection, though urinating immediately after intercourse can help prevent infections.  Drinking lots of fluids is also helpful to clear your current infection and prevent the next one.      I have sent a prescription for antibiotics to your pharmacy to treat this infection.    It is important that you take all of your prescribed medication even if your symptoms are improving after a few doses.  Taking all of your medicine helps prevent the symptoms from returning.     If your symptoms worsen, you develop pain in your back or stomach, develop fevers, or are not improving in 5 days, please contact your primary care provider for an appointment or visit any of our convenient Walk-in or Urgent Care Centers to be seen, which can be found on our website here.    Thanks again for choosing us as your health care partner,    Reshma Hill MD   RN to see patient at home within 24-48 hours from discharge date.

## 2024-11-30 LAB
ALBUMIN SERPL ELPH-MCNC: 3.3 G/DL — SIGNIFICANT CHANGE UP (ref 3.3–5)
ALP SERPL-CCNC: 114 U/L — SIGNIFICANT CHANGE UP (ref 40–120)
ALT FLD-CCNC: 7 U/L — SIGNIFICANT CHANGE UP (ref 4–33)
ANION GAP SERPL CALC-SCNC: 12 MMOL/L — SIGNIFICANT CHANGE UP (ref 7–14)
AST SERPL-CCNC: 15 U/L — SIGNIFICANT CHANGE UP (ref 4–32)
BASOPHILS # BLD AUTO: 0.02 K/UL — SIGNIFICANT CHANGE UP (ref 0–0.2)
BASOPHILS NFR BLD AUTO: 0.2 % — SIGNIFICANT CHANGE UP (ref 0–2)
BILIRUB SERPL-MCNC: <0.2 MG/DL — SIGNIFICANT CHANGE UP (ref 0.2–1.2)
BUN SERPL-MCNC: 12 MG/DL — SIGNIFICANT CHANGE UP (ref 7–23)
CALCIUM SERPL-MCNC: 8.8 MG/DL — SIGNIFICANT CHANGE UP (ref 8.4–10.5)
CHLORIDE SERPL-SCNC: 107 MMOL/L — SIGNIFICANT CHANGE UP (ref 98–107)
CO2 SERPL-SCNC: 22 MMOL/L — SIGNIFICANT CHANGE UP (ref 22–31)
CREAT SERPL-MCNC: 0.63 MG/DL — SIGNIFICANT CHANGE UP (ref 0.5–1.3)
EGFR: 116 ML/MIN/1.73M2 — SIGNIFICANT CHANGE UP
EOSINOPHIL # BLD AUTO: 0.1 K/UL — SIGNIFICANT CHANGE UP (ref 0–0.5)
EOSINOPHIL NFR BLD AUTO: 1 % — SIGNIFICANT CHANGE UP (ref 0–6)
GLUCOSE SERPL-MCNC: 75 MG/DL — SIGNIFICANT CHANGE UP (ref 70–99)
HCT VFR BLD CALC: 29.9 % — LOW (ref 34.5–45)
HGB BLD-MCNC: 9.9 G/DL — LOW (ref 11.5–15.5)
IANC: 5.92 K/UL — SIGNIFICANT CHANGE UP (ref 1.8–7.4)
IMM GRANULOCYTES NFR BLD AUTO: 1.3 % — HIGH (ref 0–0.9)
LYMPHOCYTES # BLD AUTO: 2.99 K/UL — SIGNIFICANT CHANGE UP (ref 1–3.3)
LYMPHOCYTES # BLD AUTO: 30.8 % — SIGNIFICANT CHANGE UP (ref 13–44)
MCHC RBC-ENTMCNC: 30.9 PG — SIGNIFICANT CHANGE UP (ref 27–34)
MCHC RBC-ENTMCNC: 33.1 G/DL — SIGNIFICANT CHANGE UP (ref 32–36)
MCV RBC AUTO: 93.4 FL — SIGNIFICANT CHANGE UP (ref 80–100)
MONOCYTES # BLD AUTO: 0.55 K/UL — SIGNIFICANT CHANGE UP (ref 0–0.9)
MONOCYTES NFR BLD AUTO: 5.7 % — SIGNIFICANT CHANGE UP (ref 2–14)
NEUTROPHILS # BLD AUTO: 5.92 K/UL — SIGNIFICANT CHANGE UP (ref 1.8–7.4)
NEUTROPHILS NFR BLD AUTO: 61 % — SIGNIFICANT CHANGE UP (ref 43–77)
NRBC # BLD: 0 /100 WBCS — SIGNIFICANT CHANGE UP (ref 0–0)
NRBC # FLD: 0 K/UL — SIGNIFICANT CHANGE UP (ref 0–0)
PLATELET # BLD AUTO: 248 K/UL — SIGNIFICANT CHANGE UP (ref 150–400)
POTASSIUM SERPL-MCNC: 4.3 MMOL/L — SIGNIFICANT CHANGE UP (ref 3.5–5.3)
POTASSIUM SERPL-SCNC: 4.3 MMOL/L — SIGNIFICANT CHANGE UP (ref 3.5–5.3)
PROT SERPL-MCNC: 6.3 G/DL — SIGNIFICANT CHANGE UP (ref 6–8.3)
RBC # BLD: 3.2 M/UL — LOW (ref 3.8–5.2)
RBC # FLD: 14.6 % — HIGH (ref 10.3–14.5)
SODIUM SERPL-SCNC: 141 MMOL/L — SIGNIFICANT CHANGE UP (ref 135–145)
WBC # BLD: 9.71 K/UL — SIGNIFICANT CHANGE UP (ref 3.8–10.5)
WBC # FLD AUTO: 9.71 K/UL — SIGNIFICANT CHANGE UP (ref 3.8–10.5)

## 2024-11-30 PROCEDURE — 93970 EXTREMITY STUDY: CPT | Mod: 26

## 2024-11-30 RX ORDER — NIFEDIPINE 10 MG
30 CAPSULE ORAL EVERY 24 HOURS
Refills: 0 | Status: DISCONTINUED | OUTPATIENT
Start: 2024-11-30 | End: 2024-12-01

## 2024-11-30 RX ADMIN — Medication 600 MILLIGRAM(S): at 00:09

## 2024-11-30 RX ADMIN — Medication 600 MILLIGRAM(S): at 13:01

## 2024-11-30 RX ADMIN — Medication 600 MILLIGRAM(S): at 06:30

## 2024-11-30 RX ADMIN — ACETAMINOPHEN 500MG 975 MILLIGRAM(S): 500 TABLET, COATED ORAL at 08:26

## 2024-11-30 RX ADMIN — Medication 600 MILLIGRAM(S): at 06:00

## 2024-11-30 RX ADMIN — Medication 30 MILLIGRAM(S): at 06:33

## 2024-11-30 RX ADMIN — ACETAMINOPHEN 500MG 975 MILLIGRAM(S): 500 TABLET, COATED ORAL at 08:56

## 2024-11-30 RX ADMIN — Medication 600 MILLIGRAM(S): at 13:31

## 2024-11-30 RX ADMIN — ACETAMINOPHEN 500MG 975 MILLIGRAM(S): 500 TABLET, COATED ORAL at 20:30

## 2024-11-30 RX ADMIN — ACETAMINOPHEN 500MG 975 MILLIGRAM(S): 500 TABLET, COATED ORAL at 21:30

## 2024-11-30 RX ADMIN — Medication 600 MILLIGRAM(S): at 17:29

## 2024-11-30 RX ADMIN — ACETAMINOPHEN 500MG 975 MILLIGRAM(S): 500 TABLET, COATED ORAL at 02:36

## 2024-11-30 RX ADMIN — Medication 10000 UNIT(S): at 20:30

## 2024-11-30 RX ADMIN — ACETAMINOPHEN 500MG 975 MILLIGRAM(S): 500 TABLET, COATED ORAL at 02:06

## 2024-11-30 NOTE — PROGRESS NOTE ADULT - ASSESSMENT
39y/o POD#3 from pLTCS +BS for di-di twins. Patient is currently stable and meeting postpartum milestones.     #gHTN  - cont to monitor pressures and symptoms    #Postpartum  - Continue with po analgesia  - Increase ambulation  - Continue regular diet  - Incision site c/d/i  - encourage IS use    A Sacks, PGY1    39y/o POD#3 from pLTCS +BS for di-di twins. Patient is currently stable and meeting postpartum milestones.     #gHTN  - cont to monitor pressures and symptoms    #Postpartum  - Continue with po analgesia  - Increase ambulation  - Continue regular diet  - Incision site c/d/i  - encourage IS use, reviewed how to use it with patient     A Sacks, PGY1    ID: [id number] 39y/o POD#3 from pLTCS +BS for di-di twins. Patient is currently stable and meeting postpartum milestones.     #gHTN  - cont to monitor pressures and symptoms  - repeat HELLP wnl   - continue procardia 30 mg QD    #Postpartum  - Continue with po analgesia  - Increase ambulation  - Continue regular diet  - Incision site c/d/i  - encourage IS use, reviewed how to use it with patient     A Sacks, PGY1    ID: [id number] 37y/o POD#3 from pLTCS +BS for di-di twins. Patient is currently stable and meeting postpartum milestones.     #gHTN vs cHTN  - cont to monitor pressures and symptoms  - repeat HELLP wnl   - continue procardia 30 mg QD    #Postpartum  - Continue with po analgesia  - Increase ambulation  - Continue regular diet  - Incision site c/d/i  - encourage IS use, reviewed how to use it with patient     A Sacks, PGY1    ID: [id number]

## 2024-11-30 NOTE — PROVIDER CONTACT NOTE (OTHER) - ASSESSMENT
Pt denies any chest pain, headaches, dizziness or distress at this time; /78, however, other vital signs stable; Safety maintained

## 2024-11-30 NOTE — PROGRESS NOTE ADULT - SUBJECTIVE AND OBJECTIVE BOX
OB Postpartum Note:  Delivery, POD#3    S: 39yo POD#3 s/p LTCS. The patient feels well.  Pain is well controlled. She is tolerating a regular diet and passing flatus. She is voiding spontaneously, and ambulating without difficulty. Denies CP/SOB. Denies lightheadedness/dizziness. Denies N/V. Denies headaches/changes to vision/RUQ pain. Patient reports intermittent episodes of mild SOB.    O:  Vitals:  Vital Signs Last 24 Hrs  T(C): 37 (2024 06:05), Max: 37.1 (2024 14:09)  T(F): 98.6 (2024 06:05), Max: 98.8 (2024 01:44)  HR: 70 (2024 06:05) (69 - 95)  BP: 152/78 (2024 06:05) (134/72 - 152/78)  BP(mean): --  RR: 18 (2024 06:05) (18 - 18)  SpO2: 98% (2024 06:05) (95% - 99%)    Parameters below as of 2024 06:05  Patient On (Oxygen Delivery Method): room air        MEDICATIONS  (STANDING):  acetaminophen     Tablet .. 975 milliGRAM(s) Oral <User Schedule>  diphtheria/tetanus/pertussis (acellular) Vaccine (Adacel) 0.5 milliLiter(s) IntraMuscular once  heparin   Injectable 40335 Unit(s) SubCutaneous every 12 hours  ibuprofen  Tablet. 600 milliGRAM(s) Oral every 6 hours  lactated ringers. 1000 milliLiter(s) (125 mL/Hr) IV Continuous <Continuous>  NIFEdipine XL 30 milliGRAM(s) Oral every 24 hours    MEDICATIONS  (PRN):  diphenhydrAMINE 25 milliGRAM(s) Oral every 6 hours PRN Pruritus  lanolin Ointment 1 Application(s) Topical every 6 hours PRN Sore Nipples  magnesium hydroxide Suspension 30 milliLiter(s) Oral two times a day PRN Constipation  oxyCODONE    IR 5 milliGRAM(s) Oral every 3 hours PRN Moderate to Severe Pain (4-10)  oxyCODONE    IR 5 milliGRAM(s) Oral once PRN Moderate to Severe Pain (4-10)  simethicone 80 milliGRAM(s) Chew every 4 hours PRN Gas      LABS:  Blood type: A Negative  Rubella IgG: RPR: Negative                          9.9[L]   9.71 >-----------< 248    (  @ 06:52 )             29.9[L]                        9.9[L]   15.09[H] >-----------< 180    (  @ 05:10 )             29.9[L]                        11.6   16.08[H] >-----------< 198    (  @ 22:15 )             34.4[L]                        11.7   8.56 >-----------< 209    (  @ 13:00 )             35.5    24 @ 06:52      141  |  107  |  12  ----------------------------<  75  4.3   |  22  |  0.63    24 @ 13:00      139  |  105  |  9   ----------------------------<  71  4.0   |  22  |  0.58        Ca    8.8      2024 06:52  Ca    9.0      2024 13:00    TPro  6.3  /  Alb  3.3  /  TBili  <0.2  /  DBili  x   /  AST  15  /  ALT  7   /  AlkPhos  114  24 @ 06:52  TPro  6.9  /  Alb  3.5  /  TBili  0.2  /  DBili  x   /  AST  15  /  ALT  7   /  AlkPhos  171[H]  24 @ 13:00        Physical exam:  Gen: NAD  Cardio: well perfused   Resp: non labored breathing   Abdomen: Soft, appropriately tender, Mildly distended, firm uterine fundus at umbilicus.  Incision: Clean, dry, and intact   Pelvic: Normal lochia noted  Ext: No calf tenderness, no erythema, no pitting edema

## 2024-11-30 NOTE — CHART NOTE - NSCHARTNOTEFT_GEN_A_CORE
Pt with elevated BPs overnight. 152/87 and 153/87 per RN. Patient with history of gHTNv cHTN with HELLP wnl and P:C 0.1. Will begin Procardia 30 and AM HELLP labs.    Discussed with Dr. Meghann Sarmiento, PGY-1

## 2024-12-01 VITALS
DIASTOLIC BLOOD PRESSURE: 82 MMHG | TEMPERATURE: 98 F | SYSTOLIC BLOOD PRESSURE: 136 MMHG | HEART RATE: 89 BPM | RESPIRATION RATE: 16 BRPM | OXYGEN SATURATION: 98 %

## 2024-12-01 RX ORDER — NIFEDIPINE 10 MG
1 CAPSULE ORAL
Qty: 30 | Refills: 1
Start: 2024-12-01 | End: 2025-01-29

## 2024-12-01 RX ORDER — NIFEDIPINE 10 MG
0 CAPSULE ORAL
Qty: 1 | Refills: 1
Start: 2024-12-01

## 2024-12-01 RX ORDER — NIFEDIPINE 10 MG
CAPSULE ORAL
Qty: 1 | Refills: 1
Start: 2024-12-01

## 2024-12-01 RX ADMIN — ACETAMINOPHEN 500MG 975 MILLIGRAM(S): 500 TABLET, COATED ORAL at 08:45

## 2024-12-01 RX ADMIN — Medication 30 MILLIGRAM(S): at 06:28

## 2024-12-01 RX ADMIN — ACETAMINOPHEN 500MG 975 MILLIGRAM(S): 500 TABLET, COATED ORAL at 09:40

## 2024-12-01 RX ADMIN — Medication 600 MILLIGRAM(S): at 07:00

## 2024-12-01 RX ADMIN — Medication 600 MILLIGRAM(S): at 01:00

## 2024-12-01 RX ADMIN — Medication 600 MILLIGRAM(S): at 00:42

## 2024-12-01 RX ADMIN — Medication 10000 UNIT(S): at 08:49

## 2024-12-01 RX ADMIN — Medication 600 MILLIGRAM(S): at 12:20

## 2024-12-01 RX ADMIN — Medication 600 MILLIGRAM(S): at 06:28

## 2024-12-01 RX ADMIN — Medication 600 MILLIGRAM(S): at 11:29

## 2024-12-01 NOTE — PROGRESS NOTE ADULT - ATTENDING COMMENTS
Pt seen and examined.  Pt denies HA, CP, SOB, calf pain, fevers/ chills.  Pt tolerating regular diet -N/-V, awaiting flatus, -BM.  Pt reports pain tolerable with PO pain meds.  Voiding and ambulating without difficulty.  Pt reports moderate lochia.     ICU Vital Signs Last 24 Hrs  T(C): 36.7 (28 Nov 2024 13:46), Max: 37.2 (28 Nov 2024 09:31)  T(F): 98.1 (28 Nov 2024 13:46), Max: 98.9 (28 Nov 2024 09:31)  HR: 73 (28 Nov 2024 13:46) (55 - 73)  BP: 102/55 (28 Nov 2024 13:46) (89/64 - 148/83)  BP(mean): 103 (27 Nov 2024 21:00) (72 - 124)  ABP: --  ABP(mean): --  RR: 18 (28 Nov 2024 13:46) (12 - 20)  SpO2: 98% (28 Nov 2024 13:46) (96% - 100%)    O2 Parameters below as of 28 Nov 2024 13:46  Patient On (Oxygen Delivery Method): room air    General: NAD  Abd: fundus firm nontender  Incision: C/D/I  Ext: no calf tenderness b/l       POD1 sp PLTCS and bilateral salpingectomy 2/2 twin gestation, course complicated by GHTN.    1.  GHTN- BP's within goal range, pt asymptomatic, continue to monitor BP's  2.  Encourage ambulation, awaiting flatus  3. Continue routine postop and pp care    Annmarie Hall MD
Pt seen and examined.  Pt with no complaints at time of ambulation.  Pt denies HA, CP, SOB, calf pain, fevers/chills.  Pt also denies visual changes, RUQ/epigastric pain, N/V.  Pt tolerating regular diet +flatus, +BM.  Pt voiding and ambulating without difficulty.  Pt reports decreasing lochia.     ICU Vital Signs Last 24 Hrs  T(C): 36.8 (01 Dec 2024 12:20), Max: 36.9 (01 Dec 2024 10:23)  T(F): 98.2 (01 Dec 2024 12:20), Max: 98.5 (01 Dec 2024 10:23)  HR: 89 (01 Dec 2024 12:20) (70 - 89)  BP: 136/82 (01 Dec 2024 12:20) (118/69 - 143/92)  BP(mean): --  ABP: --  ABP(mean): --  RR: 16 (01 Dec 2024 12:20) (16 - 20)  SpO2: 98% (01 Dec 2024 12:20) (98% - 100%)    O2 Parameters below as of 30 Nov 2024 17:39  Patient On (Oxygen Delivery Method): room air    General: NAD  Abd: fundus firm nontender  Incision: C/D/I subcuticular closure with steristrips in place   Ext: no calf tenderness b/l       POD#4 sp PLTCS and BS @36.2wga  2/2 twin gestation FGR and elevated dopplers.  Course complicated by GHTN vs CHTN as pt with normal BP's throughout initial PNC at [place], pt then went to [facility] for 2 months no records of BP's there then pt with elevated BP's in 3rd trimester when transferred care to [facility].      1.  GHTN vs CHTN- Pt with elevated BP's and was started on procardia 30mg XL on 11/30.  labs WNL, pt asymptomatic.  BPs within goal range today on Procardia 30mg XL.  Discussed need for continued BP monitoring at home, discussed BP parameters when to return and when to call as well as si/sx to monitor for.  Pt to follow up in clinic this week.   2.  Lower calf pain- LE dopplers negative, pain resolved  3.  Acute blood loss anemia 2/2 c/s - Hct stable at 29, pt asymptomatic  4.  Pt cleared for discharge home today      Annmarie Hall MD
Agree with assessment and plan. Patient seen and evaluated. Pain well controlled, ambulating, voiding,  starting to pass flatus. Moderate lochia. Continue routine postpartum care.  in NICU due to IUGR.
Pt seen and examined at bedside. Agree with the documentation as written. Thai Interprter #[id number]    38y  POD#3 s/p scheduled PLTCS + BS. Pt with gHTN vs cHTN.     Recovering well. No longer reports SOB. Pain controlled but less so when walking, hasn't tried narcotics. Lochia appropriate. Pumping.  Tolerating po, voiding, passing flatus, ambulating w/o issue. Denies headaches, vision changes, RUQ pain. One baby in NICU. She reports some painful calf swelling BL.     Vital Signs Last 24 Hrs  T(C): 36.6 (2024 10:11), Max: 37.1 (2024 14:09)  T(F): 97.9 (2024 10:11), Max: 98.8 (2024 01:44)  HR: 81 (2024 10:11) (69 - 95)  BP: 140/82 (2024 10:11) (134/72 - 152/78)  BP(mean): --  RR: 18 (2024 10:11) (18 - 18)  SpO2: 96% (2024 10:11) (95% - 99%)    Parameters below as of 2024 06:05  Patient On (Oxygen Delivery Method): room air    Gen: NAD  CV: regular rate  Lung: unlabored   Abd: post-gravid, soft, nontender, nondistended, fundus UU, incision c/d/i  Ext: no calf tenderness, moderate nonpitting edema. Rosa's sign positive BL.   Neuro: alert                           9.9    9.71  )-----------( 248      ( 2024 06:52 )             29.9       BL calf swelling  -will plan for BLE venous dopplers to eval for DVT    gHTN vs cHTN  -started on Procardia this AM  -mild BPs this AM, if continues to remain elevated consider uptitrating Procardia  -HELLP labs this AM wnl     -meeting pp milestones  -lactation available  -acute blood loss anemia, asx  -contraception BS  -continue routine pp care  -anticipate DC POD#4 w/ baby in NICU and for monitoring BPs    NADER Abad MD

## 2024-12-01 NOTE — PROGRESS NOTE ADULT - ASSESSMENT
A/P: 37yo POD#4 s/p pLTCS+BS for di/di TIUP. Patient meeting post-op milestones.     #gHTN vs cHTN  - cont to monitor pressures and symptoms  - repeat HELLP wnl   - continue procardia 30 mg QD    #b/l LE soreness  - negative LE dopplers    #Postpartum  - Continue with po analgesia  - Increase ambulation  - Continue regular diet  - Incision site c/d/i  - encourage IS use, reviewed how to use it with patient     A Sacks, PGY1   Pt declined  services    A Sacks, PADMINIY1 A/P: 37yo POD#4 s/p pLTCS+BS for di/di TIUP. Patient meeting post-op milestones.     #gHTN vs cHTN  - cont to monitor pressures and symptoms  - repeat HELLP wnl   - continue procardia 30 mg QD    #b/l LE soreness  - negative LE dopplers    #Postpartum  - Continue with po analgesia  - Increase ambulation  - Continue regular diet  - Incision site c/d/i  - encourage IS use     A Sacks, PGY1   Pt declined  services

## 2024-12-01 NOTE — PROGRESS NOTE ADULT - SUBJECTIVE AND OBJECTIVE BOX
OB Postpartum Note:  Delivery, POD#4    S: 39yo POD#4 s/p LTCS. The patient feels well.  Pain is well controlled. She is tolerating a regular diet and passing flatus. She is voiding spontaneously, and ambulating without difficulty. Denies CP/SOB. Denies lightheadedness/dizziness. Denies N/V.    O:  Vitals:  Vital Signs Last 24 Hrs  T(C): 36.8 (01 Dec 2024 06:00), Max: 36.8 (2024 17:39)  T(F): 98.2 (01 Dec 2024 06:00), Max: 98.2 (2024 17:39)  HR: 78 (01 Dec 2024 06:00) (70 - 84)  BP: 118/69 (01 Dec 2024 06:00) (118/69 - 142/73)  BP(mean): --  RR: 18 (01 Dec 2024 06:00) (18 - 20)  SpO2: 98% (01 Dec 2024 06:00) (96% - 100%)    Parameters below as of 2024 17:39  Patient On (Oxygen Delivery Method): room air        MEDICATIONS  (STANDING):  acetaminophen     Tablet .. 975 milliGRAM(s) Oral <User Schedule>  diphtheria/tetanus/pertussis (acellular) Vaccine (Adacel) 0.5 milliLiter(s) IntraMuscular once  heparin   Injectable 28284 Unit(s) SubCutaneous every 12 hours  ibuprofen  Tablet. 600 milliGRAM(s) Oral every 6 hours  lactated ringers. 1000 milliLiter(s) (125 mL/Hr) IV Continuous <Continuous>  NIFEdipine XL 30 milliGRAM(s) Oral every 24 hours    MEDICATIONS  (PRN):  diphenhydrAMINE 25 milliGRAM(s) Oral every 6 hours PRN Pruritus  lanolin Ointment 1 Application(s) Topical every 6 hours PRN Sore Nipples  magnesium hydroxide Suspension 30 milliLiter(s) Oral two times a day PRN Constipation  oxyCODONE    IR 5 milliGRAM(s) Oral every 3 hours PRN Moderate to Severe Pain (4-10)  oxyCODONE    IR 5 milliGRAM(s) Oral once PRN Moderate to Severe Pain (4-10)  simethicone 80 milliGRAM(s) Chew every 4 hours PRN Gas      LABS:  Blood type: A Negative  Rubella IgG: RPR: Negative                          9.9[L]   9.71 >-----------< 248    (  @ 06:52 )             29.9[L]    24 @ 06:52      141  |  107  |  12  ----------------------------<  75  4.3   |  22  |  0.63        Ca    8.8      2024 06:52    TPro  6.3  /  Alb  3.3  /  TBili  <0.2  /  DBili  x   /  AST  15  /  ALT  7   /  AlkPhos  114  24 @ 06:52          Physical exam:  Gen: NAD  Abdomen: Soft, approproiately tender, Mildly distended, firm uterine fundus at umbilicus.  Incision: Clean, dry, and intact   Pelvic: Normal lochia noted  Ext: No calf tenderness, no erythema, no pitting edema

## 2024-12-02 ENCOUNTER — APPOINTMENT (OUTPATIENT)
Dept: ANTEPARTUM | Facility: CLINIC | Age: 39
End: 2024-12-02

## 2024-12-02 PROBLEM — B00.9 HERPESVIRAL INFECTION, UNSPECIFIED: Chronic | Status: ACTIVE | Noted: 2024-11-27

## 2024-12-02 PROBLEM — Z14.1 CYSTIC FIBROSIS CARRIER: Chronic | Status: ACTIVE | Noted: 2024-11-27

## 2024-12-04 LAB — SURGICAL PATHOLOGY STUDY: SIGNIFICANT CHANGE UP

## 2024-12-05 ENCOUNTER — NON-APPOINTMENT (OUTPATIENT)
Age: 39
End: 2024-12-05

## 2024-12-05 ENCOUNTER — OUTPATIENT (OUTPATIENT)
Dept: OUTPATIENT SERVICES | Facility: HOSPITAL | Age: 39
LOS: 1 days | End: 2024-12-05

## 2024-12-05 ENCOUNTER — APPOINTMENT (OUTPATIENT)
Dept: ANTEPARTUM | Facility: CLINIC | Age: 39
End: 2024-12-05

## 2024-12-05 ENCOUNTER — APPOINTMENT (OUTPATIENT)
Dept: OBGYN | Facility: HOSPITAL | Age: 39
End: 2024-12-05
Payer: MEDICAID

## 2024-12-05 VITALS
DIASTOLIC BLOOD PRESSURE: 86 MMHG | WEIGHT: 232 LBS | TEMPERATURE: 97.8 F | SYSTOLIC BLOOD PRESSURE: 122 MMHG | BODY MASS INDEX: 36.41 KG/M2 | HEART RATE: 85 BPM | HEIGHT: 67 IN

## 2024-12-05 DIAGNOSIS — Z92.29 PERSONAL HISTORY OF OTHER DRUG THERAPY: ICD-10-CM

## 2024-12-05 DIAGNOSIS — O36.5990 MATERNAL CARE FOR OTHER KNOWN OR SUSPECTED POOR FETAL GROWTH, UNSPECIFIED TRIMESTER, NOT APPLICABLE OR UNSPECIFIED: ICD-10-CM

## 2024-12-05 DIAGNOSIS — O28.0 ABNORMAL HEMATOLOGICAL FINDING ON ANTENATAL SCREENING OF MOTHER: ICD-10-CM

## 2024-12-05 DIAGNOSIS — O09.93 SUPERVISION OF HIGH RISK PREGNANCY, UNSPECIFIED, THIRD TRIMESTER: ICD-10-CM

## 2024-12-05 DIAGNOSIS — Z30.09 ENCOUNTER FOR OTHER GENERAL COUNSELING AND ADVICE ON CONTRACEPTION: ICD-10-CM

## 2024-12-05 DIAGNOSIS — O09.899 ABNORMAL HEMATOLOGICAL FINDING ON ANTENATAL SCREENING OF MOTHER: ICD-10-CM

## 2024-12-05 DIAGNOSIS — R79.89 OTHER SPECIFIED ABNORMAL FINDINGS OF BLOOD CHEMISTRY: ICD-10-CM

## 2024-12-05 DIAGNOSIS — O26.899 OTHER SPECIFIED PREGNANCY RELATED CONDITIONS, UNSPECIFIED TRIMESTER: ICD-10-CM

## 2024-12-05 DIAGNOSIS — Z67.91 OTHER SPECIFIED PREGNANCY RELATED CONDITIONS, UNSPECIFIED TRIMESTER: ICD-10-CM

## 2024-12-05 DIAGNOSIS — O10.913 UNSPECIFIED PRE-EXISTING HYPERTENSION COMPLICATING PREGNANCY, THIRD TRIMESTER: ICD-10-CM

## 2024-12-05 DIAGNOSIS — O99.810 ABNORMAL GLUCOSE COMPLICATING PREGNANCY: ICD-10-CM

## 2024-12-05 DIAGNOSIS — Z23 ENCOUNTER FOR IMMUNIZATION: ICD-10-CM

## 2024-12-05 DIAGNOSIS — O30.049 TWIN PREGNANCY, DICHORIONIC/DIAMNIOTIC, UNSPECIFIED TRIMESTER: ICD-10-CM

## 2024-12-05 PROCEDURE — 99213 OFFICE O/P EST LOW 20 MIN: CPT

## 2024-12-06 DIAGNOSIS — Z98.891 HISTORY OF UTERINE SCAR FROM PREVIOUS SURGERY: ICD-10-CM

## 2024-12-06 DIAGNOSIS — Z01.30 ENCOUNTER FOR EXAMINATION OF BLOOD PRESSURE WITHOUT ABNORMAL FINDINGS: ICD-10-CM

## 2024-12-06 DIAGNOSIS — Z51.89 ENCOUNTER FOR OTHER SPECIFIED AFTERCARE: ICD-10-CM

## 2024-12-09 ENCOUNTER — APPOINTMENT (OUTPATIENT)
Dept: ANTEPARTUM | Facility: CLINIC | Age: 39
End: 2024-12-09

## 2024-12-10 ENCOUNTER — NON-APPOINTMENT (OUTPATIENT)
Age: 39
End: 2024-12-10

## 2024-12-10 ENCOUNTER — APPOINTMENT (OUTPATIENT)
Dept: OBGYN | Facility: HOSPITAL | Age: 39
End: 2024-12-10

## 2024-12-12 ENCOUNTER — APPOINTMENT (OUTPATIENT)
Dept: ANTEPARTUM | Facility: CLINIC | Age: 39
End: 2024-12-12

## 2024-12-12 ENCOUNTER — APPOINTMENT (OUTPATIENT)
Dept: OBGYN | Facility: HOSPITAL | Age: 39
End: 2024-12-12

## 2024-12-12 LAB — SURGICAL PATHOLOGY STUDY: SIGNIFICANT CHANGE UP

## 2024-12-13 ENCOUNTER — NON-APPOINTMENT (OUTPATIENT)
Age: 39
End: 2024-12-13

## 2024-12-13 ENCOUNTER — APPOINTMENT (OUTPATIENT)
Dept: CARDIOLOGY | Facility: CLINIC | Age: 39
End: 2024-12-13
Payer: MEDICAID

## 2024-12-13 VITALS
TEMPERATURE: 98 F | WEIGHT: 223 LBS | HEIGHT: 67 IN | BODY MASS INDEX: 35 KG/M2 | SYSTOLIC BLOOD PRESSURE: 121 MMHG | HEART RATE: 83 BPM | OXYGEN SATURATION: 97 % | DIASTOLIC BLOOD PRESSURE: 77 MMHG

## 2024-12-13 PROCEDURE — 99204 OFFICE O/P NEW MOD 45 MIN: CPT | Mod: 25

## 2024-12-13 PROCEDURE — 93000 ELECTROCARDIOGRAM COMPLETE: CPT

## 2024-12-16 ENCOUNTER — APPOINTMENT (OUTPATIENT)
Dept: OBGYN | Facility: HOSPITAL | Age: 39
End: 2024-12-16

## 2024-12-16 ENCOUNTER — APPOINTMENT (OUTPATIENT)
Dept: ANTEPARTUM | Facility: CLINIC | Age: 39
End: 2024-12-16

## 2024-12-18 ENCOUNTER — APPOINTMENT (OUTPATIENT)
Dept: OBGYN | Facility: HOSPITAL | Age: 39
End: 2024-12-18

## 2024-12-19 ENCOUNTER — APPOINTMENT (OUTPATIENT)
Dept: ANTEPARTUM | Facility: CLINIC | Age: 39
End: 2024-12-19

## 2024-12-23 ENCOUNTER — APPOINTMENT (OUTPATIENT)
Dept: ANTEPARTUM | Facility: CLINIC | Age: 39
End: 2024-12-23

## 2025-01-16 ENCOUNTER — NON-APPOINTMENT (OUTPATIENT)
Age: 40
End: 2025-01-16

## 2025-01-16 ENCOUNTER — OUTPATIENT (OUTPATIENT)
Dept: OUTPATIENT SERVICES | Facility: HOSPITAL | Age: 40
LOS: 1 days | End: 2025-01-16

## 2025-01-16 ENCOUNTER — APPOINTMENT (OUTPATIENT)
Dept: OBGYN | Facility: HOSPITAL | Age: 40
End: 2025-01-16
Payer: MEDICAID

## 2025-01-16 VITALS
BODY MASS INDEX: 35.16 KG/M2 | TEMPERATURE: 97.2 F | SYSTOLIC BLOOD PRESSURE: 137 MMHG | HEART RATE: 59 BPM | WEIGHT: 224 LBS | HEIGHT: 67 IN | DIASTOLIC BLOOD PRESSURE: 82 MMHG

## 2025-01-16 DIAGNOSIS — D50.9 IRON DEFICIENCY ANEMIA, UNSPECIFIED: ICD-10-CM

## 2025-01-16 DIAGNOSIS — Z98.890 OTHER SPECIFIED POSTPROCEDURAL STATES: Chronic | ICD-10-CM

## 2025-01-16 PROCEDURE — 99213 OFFICE O/P EST LOW 20 MIN: CPT | Mod: TH,25

## 2025-01-17 DIAGNOSIS — D50.9 IRON DEFICIENCY ANEMIA, UNSPECIFIED: ICD-10-CM

## 2025-01-17 DIAGNOSIS — Z98.891 HISTORY OF UTERINE SCAR FROM PREVIOUS SURGERY: ICD-10-CM

## 2025-03-11 NOTE — DISCHARGE NOTE OB - EAT A WELL BALANCED DIET INCLUDING PROTEINS (LEAN MEATS, POULTRY, FISH AND BEANS), FRESH FRUIT OR JUICE, FRESH VEGETABLES, AND DAIRY PRODUCTS
03/11/25 1536   OTHER   Discipline physical therapist   Rehab Time/Intention   Session Not Performed other (see comments)  (Pt polietly declined, had HD this AM and requesting to eat lunch and rest. will follow up next date. pt agreeable.)   Recommendation   PT - Next Appointment 03/12/25        Statement Selected

## 2025-03-26 ENCOUNTER — APPOINTMENT (OUTPATIENT)
Dept: OBGYN | Facility: CLINIC | Age: 40
End: 2025-03-26
Payer: MEDICAID

## 2025-03-26 ENCOUNTER — NON-APPOINTMENT (OUTPATIENT)
Age: 40
End: 2025-03-26

## 2025-03-26 VITALS
BODY MASS INDEX: 35.87 KG/M2 | HEART RATE: 70 BPM | WEIGHT: 229 LBS | SYSTOLIC BLOOD PRESSURE: 128 MMHG | DIASTOLIC BLOOD PRESSURE: 85 MMHG | OXYGEN SATURATION: 99 %

## 2025-03-26 DIAGNOSIS — Z01.419 ENCOUNTER FOR GYNECOLOGICAL EXAMINATION (GENERAL) (ROUTINE) W/OUT ABNORMAL FINDINGS: ICD-10-CM

## 2025-03-26 PROCEDURE — 99395 PREV VISIT EST AGE 18-39: CPT | Mod: 25

## 2025-03-26 PROCEDURE — G0444 DEPRESSION SCREEN ANNUAL: CPT | Mod: 59

## 2025-03-28 LAB
C TRACH RRNA SPEC QL NAA+PROBE: NOT DETECTED
N GONORRHOEA RRNA SPEC QL NAA+PROBE: NOT DETECTED
SOURCE TP AMPLIFICATION: NORMAL
T VAGINALIS RRNA SPEC QL NAA+PROBE: NOT DETECTED

## 2025-04-02 LAB — CYTOLOGY CVX/VAG DOC THIN PREP: NORMAL

## 2025-06-30 NOTE — OB PROVIDER DELIVERY SUMMARY - NS_DELIVERYATTENDING1_OBGYN_ALL_OB_FT
Call [phone] to scheduling a MRI within 1 month.    It is important that you complete this imaging so we can provide the best care. If you have any concerns or need to delay the imaging for any reason please let Dr. Porter know by calling the clinic at [phone].    Dr. Porter will call with the results or review at your follow up appointment.       
Marcial Barros MD